# Patient Record
Sex: FEMALE | Race: WHITE | NOT HISPANIC OR LATINO | Employment: FULL TIME | URBAN - METROPOLITAN AREA
[De-identification: names, ages, dates, MRNs, and addresses within clinical notes are randomized per-mention and may not be internally consistent; named-entity substitution may affect disease eponyms.]

---
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
## 2021-03-01 ENCOUNTER — HOSPITAL ENCOUNTER (EMERGENCY)
Facility: HOSPITAL | Age: 21
Discharge: HOME | End: 2021-03-01
Attending: EMERGENCY MEDICINE
Payer: MEDICAID

## 2021-03-01 VITALS
HEIGHT: 61 IN | HEART RATE: 81 BPM | SYSTOLIC BLOOD PRESSURE: 109 MMHG | DIASTOLIC BLOOD PRESSURE: 65 MMHG | TEMPERATURE: 99 F | WEIGHT: 130 LBS | BODY MASS INDEX: 24.55 KG/M2 | OXYGEN SATURATION: 99 % | RESPIRATION RATE: 16 BRPM

## 2021-03-01 DIAGNOSIS — Z33.1 PREGNANT STATE, INCIDENTAL: ICD-10-CM

## 2021-03-01 DIAGNOSIS — S20.412A ABRASION OF LEFT SIDE OF BACK, INITIAL ENCOUNTER: ICD-10-CM
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
## 2021-03-01 PROCEDURE — 99283 EMERGENCY DEPT VISIT LOW MDM: CPT

## 2021-03-01 PROCEDURE — 25000003 PHARM REV CODE 250: Performed by: EMERGENCY MEDICINE

## 2021-03-01 RX ORDER — ACETAMINOPHEN 325 MG/1
650 TABLET ORAL
Status: COMPLETED | OUTPATIENT
Start: 2021-03-01 | End: 2021-03-01

## 2021-03-01 RX ADMIN — ACETAMINOPHEN 650 MG: 325 TABLET ORAL at 07:03

## 2022-01-08 ENCOUNTER — HOSPITAL ENCOUNTER (EMERGENCY)
Facility: HOSPITAL | Age: 22
Discharge: HOME | End: 2022-01-08
Attending: EMERGENCY MEDICINE
Payer: MEDICAID

## 2022-01-08 VITALS
OXYGEN SATURATION: 97 % | DIASTOLIC BLOOD PRESSURE: 71 MMHG | RESPIRATION RATE: 16 BRPM | WEIGHT: 123 LBS | SYSTOLIC BLOOD PRESSURE: 108 MMHG | HEART RATE: 86 BPM | TEMPERATURE: 98 F | BODY MASS INDEX: 23.22 KG/M2 | HEIGHT: 61 IN

## 2022-01-08 DIAGNOSIS — J01.90 ACUTE NON-RECURRENT SINUSITIS, UNSPECIFIED LOCATION: Primary | ICD-10-CM

## 2022-01-08 PROCEDURE — 99282 EMERGENCY DEPT VISIT SF MDM: CPT

## 2022-01-08 NOTE — ED PROVIDER NOTES
"Encounter Date: 1/8/2022       History     Chief Complaint   Patient presents with    Cough     "DURING THE DAY I'M FINE, BUT AT NIGHT I WAKE UP WITH SORE THROAT AND COUGH"     21-year-old female with complaints of cough and sore throat x1 week.  Denies fever, shortness of breath, also loss of taste loss of smell        Review of patient's allergies indicates:   Allergen Reactions    Dimetapp [brompheniramine-pseudoephedrin]     Stevia [stevioside (bulk)] Hives     Past Medical History:   Diagnosis Date    Anemia      History reviewed. No pertinent surgical history.  History reviewed. No pertinent family history.  Social History     Tobacco Use    Smoking status: Never Smoker    Smokeless tobacco: Never Used   Substance Use Topics    Alcohol use: Not Currently    Drug use: Never     Review of Systems   Constitutional: Negative for fever.   HENT: Negative for sore throat.    Respiratory: Positive for cough and chest tightness. Negative for shortness of breath.    Cardiovascular: Negative for chest pain.   Gastrointestinal: Negative for nausea.   Genitourinary: Negative for dysuria.   Musculoskeletal: Negative for back pain.   Skin: Negative for rash.   Neurological: Positive for headaches. Negative for weakness.   Hematological: Does not bruise/bleed easily.       Physical Exam     Initial Vitals [01/08/22 1647]   BP Pulse Resp Temp SpO2   108/71 86 16 98.2 °F (36.8 °C) 97 %      MAP       --         Physical Exam    Nursing note and vitals reviewed.  Constitutional: Vital signs are normal. She appears well-developed and well-nourished. She is cooperative.   HENT:   Head: Normocephalic and atraumatic.   Right Ear: Hearing and external ear normal.   Left Ear: Hearing and external ear normal.   Nose: Nose normal.   Mouth/Throat: Uvula is midline and mucous membranes are normal. Posterior oropharyngeal erythema present.   Post nasal drip    Eyes: Conjunctivae, EOM and lids are normal. Pupils are equal, round, " and reactive to light.   Neck: Trachea normal. Neck supple.   Normal range of motion.   Full passive range of motion without pain.     Cardiovascular: Normal rate, regular rhythm, S1 normal, S2 normal, normal heart sounds and normal pulses.   Pulmonary/Chest: Effort normal and breath sounds normal.   Musculoskeletal:      Cervical back: Full passive range of motion without pain, normal range of motion and neck supple.     Neurological: She is alert.         ED Course   Procedures  Labs Reviewed - No data to display       Imaging Results    None          Medications - No data to display  Medical Decision Making:   Differential Diagnosis:   Acute sinusitis, URI, pharyangitis  ED Management:  Attenuation phy patient will be due seasonal no antibiotics are needed at this time                       Clinical Impression:   Final diagnoses:  [J01.90] Acute non-recurrent sinusitis, unspecified location (Primary)          ED Disposition Condition    Discharge Stable        ED Prescriptions     None        Follow-up Information     Follow up With Specialties Details Why Contact Info    Bao Abad MD Family Medicine In 1 week If symptoms worsen [address] [facility] [address]  [phone]             Miguel Roberts III, NP  01/08/22 7029

## 2022-02-10 ENCOUNTER — HOSPITAL ENCOUNTER (OUTPATIENT)
Dept: RADIOLOGY | Facility: HOSPITAL | Age: 22
Discharge: HOME | End: 2022-02-10
Payer: MEDICAID

## 2022-02-10 DIAGNOSIS — M54.9 DORSALGIA: Primary | ICD-10-CM

## 2022-02-10 DIAGNOSIS — M54.9 DORSALGIA: ICD-10-CM

## 2022-02-10 PROCEDURE — 72110 X-RAY EXAM L-2 SPINE 4/>VWS: CPT | Mod: TC

## 2022-06-19 ENCOUNTER — HOSPITAL ENCOUNTER (EMERGENCY)
Facility: HOSPITAL | Age: 22
Discharge: HOME | End: 2022-06-19
Attending: EMERGENCY MEDICINE
Payer: MEDICAID

## 2022-06-19 VITALS
TEMPERATURE: 99 F | DIASTOLIC BLOOD PRESSURE: 88 MMHG | SYSTOLIC BLOOD PRESSURE: 127 MMHG | BODY MASS INDEX: 23.22 KG/M2 | HEIGHT: 61 IN | HEART RATE: 86 BPM | RESPIRATION RATE: 18 BRPM | WEIGHT: 123 LBS | OXYGEN SATURATION: 99 %

## 2022-06-19 DIAGNOSIS — R30.0 DYSURIA: Primary | ICD-10-CM

## 2022-06-19 LAB
APPEARANCE UR: CLEAR
B-HCG SERPL QL: NEGATIVE
BACTERIA #/AREA URNS AUTO: ABNORMAL /HPF
BILIRUB UR QL STRIP.AUTO: NEGATIVE MG/DL
COLOR UR AUTO: YELLOW
GLUCOSE UR QL STRIP.AUTO: NEGATIVE MG/DL
KETONES UR QL STRIP.AUTO: 15 MG/DL
LEUKOCYTE ESTERASE UR QL STRIP.AUTO: NEGATIVE UNIT/L
NITRITE UR QL STRIP.AUTO: NEGATIVE
PH UR STRIP.AUTO: 6 [PH]
PROT UR QL STRIP.AUTO: NEGATIVE MG/DL
RBC #/AREA URNS AUTO: ABNORMAL /HPF
RBC UR QL AUTO: ABNORMAL UNIT/L
SP GR UR STRIP.AUTO: 1.02
SQUAMOUS #/AREA URNS AUTO: ABNORMAL /HPF
UROBILINOGEN UR STRIP-ACNC: 0.2 MG/DL
WBC #/AREA URNS AUTO: ABNORMAL /HPF

## 2022-06-19 PROCEDURE — 81025 URINE PREGNANCY TEST: CPT | Performed by: EMERGENCY MEDICINE

## 2022-06-19 PROCEDURE — 81001 URINALYSIS AUTO W/SCOPE: CPT | Performed by: EMERGENCY MEDICINE

## 2022-06-19 PROCEDURE — 99282 EMERGENCY DEPT VISIT SF MDM: CPT | Mod: 25

## 2022-06-19 NOTE — ED PROVIDER NOTES
Encounter Date: 6/19/2022       History     Chief Complaint   Patient presents with    Urinary Frequency     Reports has sexual intercourse with  last night and now having frequent urination and burning.  here with same complaint     23 y/o female with c/o 1 day h/o mild dysuria with no associated urgency, frequency, fever, vaginal dc or flank pain. Pt states the discomfort started after sex with her  last night. Pt with no associated HA, neck pain, SSCP, SOB, AP, nausea, vomiting or diarrhea. Requesting UA and pregnancy test. Pt has not taken any medication for the pain.         Review of patient's allergies indicates:   Allergen Reactions    Dimetapp cold and flu      Other reaction(s): hives    Dimetapp [brompheniramine-pseudoephedrin]     Stevia [stevioside (bulk)] Hives     Past Medical History:   Diagnosis Date    Anemia     Chronic constipation      History reviewed. No pertinent surgical history.  History reviewed. No pertinent family history.  Social History     Tobacco Use    Smoking status: Never Smoker    Smokeless tobacco: Never Used   Substance Use Topics    Alcohol use: Yes     Comment: occasion    Drug use: Never     Review of Systems   All other systems reviewed and are negative.      Physical Exam     Initial Vitals [06/19/22 0924]   BP Pulse Resp Temp SpO2   127/88 86 18 99 °F (37.2 °C) 99 %      MAP       --         Physical Exam    Constitutional: She appears well-developed and well-nourished.   HENT:   Head: Normocephalic and atraumatic.   Eyes: EOM are normal. Pupils are equal, round, and reactive to light.   Neck: Neck supple.   Normal range of motion.  Cardiovascular: Normal rate, regular rhythm, normal heart sounds and intact distal pulses.   Pulmonary/Chest: Breath sounds normal.   Abdominal: Abdomen is soft. Bowel sounds are normal.   Musculoskeletal:         General: Normal range of motion.      Cervical back: Normal range of motion and neck supple.      Neurological: She is alert and oriented to person, place, and time. She has normal strength and normal reflexes.   Skin: Skin is warm and dry. Capillary refill takes less than 2 seconds.   Psychiatric: She has a normal mood and affect. Her behavior is normal. Judgment and thought content normal.         ED Course   Procedures  Labs Reviewed   URINALYSIS, REFLEX TO URINE CULTURE - Abnormal; Notable for the following components:       Result Value    Ketones, UA 15  (*)     Blood, UA Trace-Intact (*)     All other components within normal limits   URINALYSIS, MICROSCOPIC - Abnormal; Notable for the following components:    Squamous Epithelial Cells, UA Few (*)     All other components within normal limits   HCG QUALITATIVE URINE - Normal          Imaging Results    None          Medications - No data to display  Medical Decision Making:   ED Management:  21 y/o c/o mild dysuria after sexual intercourse last night. UA negative for infection and pregnancy. No pain on exam. Will dc home to fu PMD as OP, advised return PRN worsening symptoms.                      Clinical Impression:   Final diagnoses:  [R30.0] Dysuria (Primary)          ED Disposition Condition    Discharge Stable        ED Prescriptions     None        Follow-up Information     Follow up With Specialties Details Why Contact Info    Bao Abad MD Family Medicine Schedule an appointment as soon as possible for a visit in 1 day  [address] [facility] [address]  [phone]             Albin Reeves MD  06/19/22 6734       Albin Reeves MD  06/19/22 6069

## 2023-05-27 ENCOUNTER — HOSPITAL ENCOUNTER (INPATIENT)
Facility: HOSPITAL | Age: 23
LOS: 3 days | Discharge: HOME | DRG: 917 | End: 2023-05-30
Attending: STUDENT IN AN ORGANIZED HEALTH CARE EDUCATION/TRAINING PROGRAM | Admitting: INTERNAL MEDICINE
Payer: MEDICAID

## 2023-05-27 DIAGNOSIS — I21.4 NSTEMI (NON-ST ELEVATED MYOCARDIAL INFARCTION): ICD-10-CM

## 2023-05-27 DIAGNOSIS — Z00.8 MEDICAL CLEARANCE FOR PSYCHIATRIC ADMISSION: ICD-10-CM

## 2023-05-27 DIAGNOSIS — F41.0 PANIC ATTACK: ICD-10-CM

## 2023-05-27 DIAGNOSIS — F14.10 COCAINE ABUSE: ICD-10-CM

## 2023-05-27 DIAGNOSIS — R79.89 ELEVATED TROPONIN: Primary | ICD-10-CM

## 2023-05-27 LAB
ALBUMIN SERPL BCP-MCNC: 4.1 G/DL (ref 3.5–5.2)
ALP SERPL-CCNC: 53 U/L (ref 55–135)
ALT SERPL W/O P-5'-P-CCNC: 17 U/L (ref 10–44)
AMPHET+METHAMPHET UR QL: ABNORMAL
ANION GAP SERPL CALC-SCNC: 12 MMOL/L (ref 8–16)
APAP SERPL-MCNC: <2 UG/ML (ref 10–20)
APTT PPP: 24.6 SEC (ref 21–32)
AST SERPL-CCNC: 13 U/L (ref 10–40)
B-HCG UR QL: NEGATIVE
BARBITURATES UR QL SCN>200 NG/ML: NEGATIVE
BASOPHILS # BLD AUTO: 0.02 K/UL (ref 0–0.2)
BASOPHILS NFR BLD: 0.2 % (ref 0–1.9)
BENZODIAZ UR QL SCN>200 NG/ML: ABNORMAL
BILIRUB SERPL-MCNC: 0.9 MG/DL (ref 0.1–1)
BUN SERPL-MCNC: 8 MG/DL (ref 6–20)
BZE UR QL SCN: NEGATIVE
CALCIUM SERPL-MCNC: 10.3 MG/DL (ref 8.7–10.5)
CANNABINOIDS UR QL SCN: NEGATIVE
CHLORIDE SERPL-SCNC: 107 MMOL/L (ref 95–110)
CO2 SERPL-SCNC: 19 MMOL/L (ref 23–29)
CREAT SERPL-MCNC: 1 MG/DL (ref 0.5–1.4)
CREAT UR-MCNC: 59 MG/DL (ref 15–325)
DIFFERENTIAL METHOD: ABNORMAL
EOSINOPHIL # BLD AUTO: 0 K/UL (ref 0–0.5)
EOSINOPHIL NFR BLD: 0 % (ref 0–8)
ERYTHROCYTE [DISTWIDTH] IN BLOOD BY AUTOMATED COUNT: 11 % (ref 11.5–14.5)
EST. GFR  (NO RACE VARIABLE): >60 ML/MIN/1.73 M^2
ETHANOL SERPL-MCNC: <3 MG/DL
GLUCOSE SERPL-MCNC: 110 MG/DL (ref 70–110)
HCT VFR BLD AUTO: 43.2 % (ref 37–48.5)
HGB BLD-MCNC: 15.4 G/DL (ref 12–16)
IMM GRANULOCYTES # BLD AUTO: 0.03 K/UL (ref 0–0.04)
IMM GRANULOCYTES NFR BLD AUTO: 0.3 % (ref 0–0.5)
INR PPP: 1 (ref 0.8–1.2)
LYMPHOCYTES # BLD AUTO: 2.5 K/UL (ref 1–4.8)
LYMPHOCYTES NFR BLD: 21.8 % (ref 18–48)
MAGNESIUM SERPL-MCNC: 1.6 MG/DL (ref 1.6–2.6)
MCH RBC QN AUTO: 30.9 PG (ref 27–31)
MCHC RBC AUTO-ENTMCNC: 35.6 G/DL (ref 32–36)
MCV RBC AUTO: 87 FL (ref 82–98)
METHADONE UR QL SCN>300 NG/ML: NEGATIVE
MONOCYTES # BLD AUTO: 0.9 K/UL (ref 0.3–1)
MONOCYTES NFR BLD: 7.8 % (ref 4–15)
NEUTROPHILS # BLD AUTO: 8 K/UL (ref 1.8–7.7)
NEUTROPHILS NFR BLD: 69.9 % (ref 38–73)
NRBC BLD-RTO: 0 /100 WBC
NT-PROBNP SERPL-MCNC: 348 PG/ML (ref 5–450)
OPIATES UR QL SCN: NEGATIVE
PCP UR QL SCN>25 NG/ML: NEGATIVE
PLATELET # BLD AUTO: 333 K/UL (ref 150–450)
PMV BLD AUTO: 10.2 FL (ref 9.2–12.9)
POTASSIUM SERPL-SCNC: 3.1 MMOL/L (ref 3.5–5.1)
PROT SERPL-MCNC: 8.7 G/DL (ref 6–8.4)
PROTHROMBIN TIME: 10.6 SEC (ref 9–12.5)
RBC # BLD AUTO: 4.99 M/UL (ref 4–5.4)
SODIUM SERPL-SCNC: 138 MMOL/L (ref 136–145)
TOXICOLOGY INFORMATION: ABNORMAL
TROPONIN I SERPL DL<=0.01 NG/ML-MCNC: 163 PG/ML (ref 0–60)
TROPONIN I SERPL DL<=0.01 NG/ML-MCNC: 163.2 PG/ML (ref 0–60)
TSH SERPL DL<=0.005 MIU/L-ACNC: 3.46 UIU/ML (ref 0.4–4)
WBC # BLD AUTO: 11.38 K/UL (ref 3.9–12.7)

## 2023-05-27 PROCEDURE — 63600175 PHARM REV CODE 636 W HCPCS: Performed by: STUDENT IN AN ORGANIZED HEALTH CARE EDUCATION/TRAINING PROGRAM

## 2023-05-27 PROCEDURE — 36415 COLL VENOUS BLD VENIPUNCTURE: CPT | Performed by: STUDENT IN AN ORGANIZED HEALTH CARE EDUCATION/TRAINING PROGRAM

## 2023-05-27 PROCEDURE — 83735 ASSAY OF MAGNESIUM: CPT | Performed by: STUDENT IN AN ORGANIZED HEALTH CARE EDUCATION/TRAINING PROGRAM

## 2023-05-27 PROCEDURE — 85730 THROMBOPLASTIN TIME PARTIAL: CPT | Performed by: STUDENT IN AN ORGANIZED HEALTH CARE EDUCATION/TRAINING PROGRAM

## 2023-05-27 PROCEDURE — 96374 THER/PROPH/DIAG INJ IV PUSH: CPT

## 2023-05-27 PROCEDURE — 96361 HYDRATE IV INFUSION ADD-ON: CPT | Mod: 59

## 2023-05-27 PROCEDURE — G0378 HOSPITAL OBSERVATION PER HR: HCPCS

## 2023-05-27 PROCEDURE — 93010 ELECTROCARDIOGRAM REPORT: CPT | Mod: 59,,, | Performed by: INTERNAL MEDICINE

## 2023-05-27 PROCEDURE — 11000001 HC ACUTE MED/SURG PRIVATE ROOM

## 2023-05-27 PROCEDURE — 84484 ASSAY OF TROPONIN QUANT: CPT | Performed by: STUDENT IN AN ORGANIZED HEALTH CARE EDUCATION/TRAINING PROGRAM

## 2023-05-27 PROCEDURE — 80053 COMPREHEN METABOLIC PANEL: CPT | Performed by: STUDENT IN AN ORGANIZED HEALTH CARE EDUCATION/TRAINING PROGRAM

## 2023-05-27 PROCEDURE — 85610 PROTHROMBIN TIME: CPT | Performed by: STUDENT IN AN ORGANIZED HEALTH CARE EDUCATION/TRAINING PROGRAM

## 2023-05-27 PROCEDURE — 85025 COMPLETE CBC W/AUTO DIFF WBC: CPT | Performed by: STUDENT IN AN ORGANIZED HEALTH CARE EDUCATION/TRAINING PROGRAM

## 2023-05-27 PROCEDURE — 82077 ASSAY SPEC XCP UR&BREATH IA: CPT | Performed by: STUDENT IN AN ORGANIZED HEALTH CARE EDUCATION/TRAINING PROGRAM

## 2023-05-27 PROCEDURE — 80307 DRUG TEST PRSMV CHEM ANLYZR: CPT | Performed by: STUDENT IN AN ORGANIZED HEALTH CARE EDUCATION/TRAINING PROGRAM

## 2023-05-27 PROCEDURE — 80143 DRUG ASSAY ACETAMINOPHEN: CPT | Performed by: STUDENT IN AN ORGANIZED HEALTH CARE EDUCATION/TRAINING PROGRAM

## 2023-05-27 PROCEDURE — 96376 TX/PRO/DX INJ SAME DRUG ADON: CPT

## 2023-05-27 PROCEDURE — 99285 EMERGENCY DEPT VISIT HI MDM: CPT | Mod: 25

## 2023-05-27 PROCEDURE — 83880 ASSAY OF NATRIURETIC PEPTIDE: CPT | Performed by: STUDENT IN AN ORGANIZED HEALTH CARE EDUCATION/TRAINING PROGRAM

## 2023-05-27 PROCEDURE — 25000003 PHARM REV CODE 250: Performed by: STUDENT IN AN ORGANIZED HEALTH CARE EDUCATION/TRAINING PROGRAM

## 2023-05-27 PROCEDURE — 93010 EKG 12-LEAD: ICD-10-PCS | Mod: 59,,, | Performed by: INTERNAL MEDICINE

## 2023-05-27 PROCEDURE — 84443 ASSAY THYROID STIM HORMONE: CPT | Performed by: STUDENT IN AN ORGANIZED HEALTH CARE EDUCATION/TRAINING PROGRAM

## 2023-05-27 PROCEDURE — 81025 URINE PREGNANCY TEST: CPT | Performed by: STUDENT IN AN ORGANIZED HEALTH CARE EDUCATION/TRAINING PROGRAM

## 2023-05-27 PROCEDURE — 93005 ELECTROCARDIOGRAM TRACING: CPT

## 2023-05-27 RX ORDER — NORELGESTROMIN AND ETHINYL ESTRADIOL 35; 150 UG/MG; UG/MG
1 PATCH TRANSDERMAL WEEKLY
COMMUNITY

## 2023-05-27 RX ORDER — ONDANSETRON 2 MG/ML
4 INJECTION INTRAMUSCULAR; INTRAVENOUS EVERY 8 HOURS PRN
Status: DISCONTINUED | OUTPATIENT
Start: 2023-05-27 | End: 2023-05-30 | Stop reason: HOSPADM

## 2023-05-27 RX ORDER — POTASSIUM CHLORIDE 20 MEQ/1
40 TABLET, EXTENDED RELEASE ORAL
Status: COMPLETED | OUTPATIENT
Start: 2023-05-27 | End: 2023-05-27

## 2023-05-27 RX ORDER — LORAZEPAM 2 MG/ML
1 INJECTION INTRAMUSCULAR
Status: COMPLETED | OUTPATIENT
Start: 2023-05-27 | End: 2023-05-27

## 2023-05-27 RX ORDER — TALC
6 POWDER (GRAM) TOPICAL NIGHTLY PRN
Status: DISCONTINUED | OUTPATIENT
Start: 2023-05-27 | End: 2023-05-30 | Stop reason: HOSPADM

## 2023-05-27 RX ORDER — IBUPROFEN 600 MG/1
600 TABLET ORAL EVERY 6 HOURS PRN
Status: DISCONTINUED | OUTPATIENT
Start: 2023-05-27 | End: 2023-05-28

## 2023-05-27 RX ORDER — SODIUM CHLORIDE 0.9 % (FLUSH) 0.9 %
10 SYRINGE (ML) INJECTION
Status: DISCONTINUED | OUTPATIENT
Start: 2023-05-27 | End: 2023-05-30 | Stop reason: HOSPADM

## 2023-05-27 RX ORDER — ASPIRIN 325 MG
325 TABLET ORAL
Status: COMPLETED | OUTPATIENT
Start: 2023-05-27 | End: 2023-05-27

## 2023-05-27 RX ADMIN — SODIUM CHLORIDE 1000 ML: 9 INJECTION, SOLUTION INTRAVENOUS at 06:05

## 2023-05-27 RX ADMIN — SODIUM CHLORIDE, POTASSIUM CHLORIDE, SODIUM LACTATE AND CALCIUM CHLORIDE 1000 ML: 600; 310; 30; 20 INJECTION, SOLUTION INTRAVENOUS at 08:05

## 2023-05-27 RX ADMIN — LORAZEPAM 1 MG: 2 INJECTION INTRAMUSCULAR; INTRAVENOUS at 06:05

## 2023-05-27 RX ADMIN — LORAZEPAM 1 MG: 2 INJECTION INTRAMUSCULAR; INTRAVENOUS at 07:05

## 2023-05-27 RX ADMIN — ASPIRIN 325 MG ORAL TABLET 325 MG: 325 PILL ORAL at 07:05

## 2023-05-27 RX ADMIN — POTASSIUM CHLORIDE 40 MEQ: 1500 TABLET, EXTENDED RELEASE ORAL at 07:05

## 2023-05-27 NOTE — ED NOTES
Pt reports to ED complaining of doing cocaine early this morning and still not feeling right. States she feels nauseous, numb and anxious. States she has done cocaine before but has never felt like this. Reports that she is not sure of the actual substance she ingested. Pt current HR at 123

## 2023-05-27 NOTE — ED PROVIDER NOTES
"Encounter Date: 5/27/2023       History     Chief Complaint   Patient presents with    Drug / Alcohol Assessment     Reports snorted cocaine at 1 or 2 this AM, "something isn't right" reports anxious, numb all over, nausea. Reports called 911 earlier this afternoon and was checked out by EMS     22 y/o female presents with diffuse body numbness, sense of doom, and nausea that started earlier today. Patient mentioned that she was binged drinking last night and used some cocaine so have not went to sleep. Denies chest pain, sob, SI, auditory hallucination, abdominal pain, vomiting     Review of patient's allergies indicates:   Allergen Reactions    Dimetapp cold and flu      Other reaction(s): hives    Dimetapp [brompheniramine-pseudoephedrin]     Stevia [stevioside (bulk)] Hives     Past Medical History:   Diagnosis Date    Anemia     Chronic constipation      History reviewed. No pertinent surgical history.  History reviewed. No pertinent family history.  Social History     Tobacco Use    Smoking status: Never    Smokeless tobacco: Never   Substance Use Topics    Alcohol use: Yes     Comment: occasion    Drug use: Yes     Types: Cocaine     Review of Systems   Constitutional: Negative.    HENT: Negative.     Respiratory: Negative.     Cardiovascular: Negative.    Gastrointestinal: Negative.    Genitourinary: Negative.    Musculoskeletal: Negative.    Skin: Negative.    Neurological:  Positive for numbness.   Psychiatric/Behavioral:  The patient is nervous/anxious.    All other systems reviewed and are negative.    Physical Exam     Initial Vitals [05/27/23 1818]   BP Pulse Resp Temp SpO2   130/84 (!) 159 18 97.9 °F (36.6 °C) 100 %      MAP       --         Physical Exam    Nursing note and vitals reviewed.  Constitutional: Vital signs are normal.   HENT:   Head: Normocephalic and atraumatic.   Eyes: Conjunctivae and lids are normal.   Neck: Trachea normal. Neck supple.   Cardiovascular:  Regular rhythm, normal heart " sounds, intact distal pulses and normal pulses.           No murmur heard.  Pulmonary/Chest: Breath sounds normal. No respiratory distress.   Abdominal: Abdomen is soft. Bowel sounds are normal.   Musculoskeletal:         General: Normal range of motion.      Cervical back: Neck supple.     Neurological: She is alert and oriented to person, place, and time. She has normal strength. No cranial nerve deficit or sensory deficit.   Skin: Skin is warm. Capillary refill takes less than 2 seconds.   Psychiatric: Her speech is normal. Judgment and thought content normal. Her mood appears anxious. Her affect is labile. Cognition and memory are normal.       ED Course   Critical Care    Date/Time: 5/27/2023 6:40 PM  Performed by: Jsoh Rios MD  Authorized by: Josh Rios MD   Direct patient critical care time: 10 minutes  Additional history critical care time: 5 minutes  Ordering / reviewing critical care time: 5 minutes  Documentation critical care time: 5 minutes  Consulting other physicians critical care time: 5 minutes  Other critical care time: 15 minutes  Total critical care time (exclusive of procedural time) : 45 minutes      Labs Reviewed   CBC W/ AUTO DIFFERENTIAL - Abnormal; Notable for the following components:       Result Value    RDW 11.0 (*)     Gran # (ANC) 8.0 (*)     All other components within normal limits   COMPREHENSIVE METABOLIC PANEL - Abnormal; Notable for the following components:    Potassium 3.1 (*)     CO2 19 (*)     Total Protein 8.7 (*)     Alkaline Phosphatase 53 (*)     All other components within normal limits   DRUG SCREEN PANEL, URINE EMERGENCY - Abnormal; Notable for the following components:    Benzodiazepines Presumptive Positive (*)     Amphetamine Screen, Ur Presumptive Positive (*)     All other components within normal limits    Narrative:     Specimen Source->Urine   ACETAMINOPHEN LEVEL - Abnormal; Notable for the following components:    Acetaminophen (Tylenol), Serum <2.0  (*)     All other components within normal limits   TROPONIN I HIGH SENSITIVITY - Abnormal; Notable for the following components:    Troponin I High Sensitivity 163.2 (*)     All other components within normal limits   TROPONIN I HIGH SENSITIVITY - Abnormal; Notable for the following components:    Troponin I High Sensitivity 163.0 (*)     All other components within normal limits   ALCOHOL,MEDICAL (ETHANOL)   TSH   MAGNESIUM   NT-PRO NATRIURETIC PEPTIDE   PREGNANCY TEST, URINE RAPID    Narrative:     Specimen Source->Urine   PROTIME-INR   APTT          Imaging Results    None          Medications   LORazepam injection 1 mg (1 mg Intravenous Given 5/27/23 1840)   sodium chloride 0.9% bolus 1,000 mL 1,000 mL (0 mLs Intravenous Stopped 5/27/23 1952)   aspirin tablet 325 mg (325 mg Oral Given 5/27/23 1920)   potassium chloride SA CR tablet 40 mEq (40 mEq Oral Given 5/27/23 1920)   LORazepam injection 1 mg (1 mg Intravenous Given 5/27/23 1920)   lactated ringers bolus 1,000 mL (0 mLs Intravenous Stopped 5/27/23 2130)     Medical Decision Making:   Initial Assessment:   22 y/o with no pmh presents with multiple complains after staying up late drinking alcohol and snorting cocaine. Tachycardia but otherwise stable vitals. Patient appears anxious. Denies cp. Will check for co ingestion. Ativan. Reeval   Clinical Tests:   Lab Tests: Reviewed and Ordered  The following lab test(s) were unremarkable: CBC, CMP, Troponin and BNP  Medical Tests: Ordered and Reviewed           ED Course as of 05/27/23 2159   Sat May 27, 2023   1906 Troponin I High Sensitivity(!): 163.2  Elevated troponin.  Most likely demand ischemia.  EKG shows diffuse ST depression concerning for the endocardial injury.  Ativan already given.  Will give a dose aspirin. [HD]   2156 Spoke to cardiologist who recommends admitting patient for observation.  Will continue cardiac monitoring.  Diltiazem. [HD]      ED Course User Index  [HD] Josh Rios MD                    Clinical Impression:   Final diagnoses:  [Z00.8] Medical clearance for psychiatric admission  [F41.0] Panic attack (Primary)  [F14.10] Cocaine abuse  [R77.8] Elevated troponin  [I21.4] NSTEMI (non-ST elevated myocardial infarction)        ED Disposition Condition    Observation Stable                Josh Rios MD  05/27/23 5435

## 2023-05-28 PROBLEM — I21.4 NON-STEMI (NON-ST ELEVATED MYOCARDIAL INFARCTION): Status: ACTIVE | Noted: 2023-05-28

## 2023-05-28 LAB
ALBUMIN SERPL BCP-MCNC: 3 G/DL (ref 3.5–5.2)
ALP SERPL-CCNC: 40 U/L (ref 55–135)
ALT SERPL W/O P-5'-P-CCNC: 19 U/L (ref 10–44)
ANION GAP SERPL CALC-SCNC: 3 MMOL/L (ref 8–16)
AST SERPL-CCNC: 16 U/L (ref 10–40)
BASOPHILS # BLD AUTO: 0.03 K/UL (ref 0–0.2)
BASOPHILS NFR BLD: 0.4 % (ref 0–1.9)
BILIRUB SERPL-MCNC: 0.8 MG/DL (ref 0.1–1)
BUN SERPL-MCNC: 8 MG/DL (ref 6–20)
CALCIUM SERPL-MCNC: 8.6 MG/DL (ref 8.7–10.5)
CHLORIDE SERPL-SCNC: 114 MMOL/L (ref 95–110)
CO2 SERPL-SCNC: 24 MMOL/L (ref 23–29)
CREAT SERPL-MCNC: 0.7 MG/DL (ref 0.5–1.4)
D DIMER PPP IA.FEU-MCNC: 0.51 MG/L FEU
DIFFERENTIAL METHOD: ABNORMAL
EOSINOPHIL # BLD AUTO: 0.2 K/UL (ref 0–0.5)
EOSINOPHIL NFR BLD: 1.9 % (ref 0–8)
ERYTHROCYTE [DISTWIDTH] IN BLOOD BY AUTOMATED COUNT: 11.3 % (ref 11.5–14.5)
EST. GFR  (NO RACE VARIABLE): >60 ML/MIN/1.73 M^2
GLUCOSE SERPL-MCNC: 104 MG/DL (ref 70–110)
HCT VFR BLD AUTO: 37.2 % (ref 37–48.5)
HGB BLD-MCNC: 12.5 G/DL (ref 12–16)
IMM GRANULOCYTES # BLD AUTO: 0.01 K/UL (ref 0–0.04)
IMM GRANULOCYTES NFR BLD AUTO: 0.1 % (ref 0–0.5)
LYMPHOCYTES # BLD AUTO: 2.4 K/UL (ref 1–4.8)
LYMPHOCYTES NFR BLD: 30.6 % (ref 18–48)
MCH RBC QN AUTO: 30.3 PG (ref 27–31)
MCHC RBC AUTO-ENTMCNC: 33.6 G/DL (ref 32–36)
MCV RBC AUTO: 90 FL (ref 82–98)
MONOCYTES # BLD AUTO: 0.9 K/UL (ref 0.3–1)
MONOCYTES NFR BLD: 10.9 % (ref 4–15)
NEUTROPHILS # BLD AUTO: 4.5 K/UL (ref 1.8–7.7)
NEUTROPHILS NFR BLD: 56.1 % (ref 38–73)
NRBC BLD-RTO: 0 /100 WBC
PLATELET # BLD AUTO: 230 K/UL (ref 150–450)
PMV BLD AUTO: 9.7 FL (ref 9.2–12.9)
POTASSIUM SERPL-SCNC: 3.9 MMOL/L (ref 3.5–5.1)
PROT SERPL-MCNC: 6.5 G/DL (ref 6–8.4)
RBC # BLD AUTO: 4.12 M/UL (ref 4–5.4)
SODIUM SERPL-SCNC: 141 MMOL/L (ref 136–145)
TROPONIN I SERPL DL<=0.01 NG/ML-MCNC: 135.4 PG/ML (ref 0–60)
TROPONIN I SERPL DL<=0.01 NG/ML-MCNC: 90.1 PG/ML (ref 0–60)
WBC # BLD AUTO: 7.92 K/UL (ref 3.9–12.7)

## 2023-05-28 PROCEDURE — 99900035 HC TECH TIME PER 15 MIN (STAT)

## 2023-05-28 PROCEDURE — 93005 ELECTROCARDIOGRAM TRACING: CPT

## 2023-05-28 PROCEDURE — 99900031 HC PATIENT EDUCATION (STAT)

## 2023-05-28 PROCEDURE — 36415 COLL VENOUS BLD VENIPUNCTURE: CPT | Performed by: INTERNAL MEDICINE

## 2023-05-28 PROCEDURE — 25000003 PHARM REV CODE 250: Performed by: INTERNAL MEDICINE

## 2023-05-28 PROCEDURE — 84484 ASSAY OF TROPONIN QUANT: CPT | Performed by: STUDENT IN AN ORGANIZED HEALTH CARE EDUCATION/TRAINING PROGRAM

## 2023-05-28 PROCEDURE — 80053 COMPREHEN METABOLIC PANEL: CPT | Performed by: STUDENT IN AN ORGANIZED HEALTH CARE EDUCATION/TRAINING PROGRAM

## 2023-05-28 PROCEDURE — 85379 FIBRIN DEGRADATION QUANT: CPT | Performed by: INTERNAL MEDICINE

## 2023-05-28 PROCEDURE — 94761 N-INVAS EAR/PLS OXIMETRY MLT: CPT

## 2023-05-28 PROCEDURE — 85025 COMPLETE CBC W/AUTO DIFF WBC: CPT | Performed by: STUDENT IN AN ORGANIZED HEALTH CARE EDUCATION/TRAINING PROGRAM

## 2023-05-28 PROCEDURE — 11000001 HC ACUTE MED/SURG PRIVATE ROOM

## 2023-05-28 PROCEDURE — 36415 COLL VENOUS BLD VENIPUNCTURE: CPT | Performed by: STUDENT IN AN ORGANIZED HEALTH CARE EDUCATION/TRAINING PROGRAM

## 2023-05-28 PROCEDURE — 25000003 PHARM REV CODE 250: Performed by: STUDENT IN AN ORGANIZED HEALTH CARE EDUCATION/TRAINING PROGRAM

## 2023-05-28 PROCEDURE — G0378 HOSPITAL OBSERVATION PER HR: HCPCS

## 2023-05-28 PROCEDURE — 93010 ELECTROCARDIOGRAM REPORT: CPT | Mod: ,,, | Performed by: INTERNAL MEDICINE

## 2023-05-28 PROCEDURE — 93010 EKG 12-LEAD: ICD-10-PCS | Mod: ,,, | Performed by: INTERNAL MEDICINE

## 2023-05-28 PROCEDURE — 27000221 HC OXYGEN, UP TO 24 HOURS

## 2023-05-28 PROCEDURE — 63600175 PHARM REV CODE 636 W HCPCS: Performed by: INTERNAL MEDICINE

## 2023-05-28 PROCEDURE — 96372 THER/PROPH/DIAG INJ SC/IM: CPT | Performed by: INTERNAL MEDICINE

## 2023-05-28 RX ORDER — DILTIAZEM HYDROCHLORIDE 120 MG/1
120 CAPSULE, COATED, EXTENDED RELEASE ORAL DAILY
Status: DISCONTINUED | OUTPATIENT
Start: 2023-05-28 | End: 2023-05-29

## 2023-05-28 RX ORDER — CLOPIDOGREL BISULFATE 75 MG/1
75 TABLET ORAL ONCE
Status: COMPLETED | OUTPATIENT
Start: 2023-05-28 | End: 2023-05-28

## 2023-05-28 RX ORDER — NITROGLYCERIN 80 MG/1
1 PATCH TRANSDERMAL DAILY
Status: DISCONTINUED | OUTPATIENT
Start: 2023-05-28 | End: 2023-05-29

## 2023-05-28 RX ORDER — ALPRAZOLAM 0.25 MG/1
0.25 TABLET ORAL EVERY 8 HOURS PRN
Status: DISCONTINUED | OUTPATIENT
Start: 2023-05-28 | End: 2023-05-28

## 2023-05-28 RX ORDER — ALPRAZOLAM 0.5 MG/1
0.5 TABLET ORAL EVERY 8 HOURS PRN
Status: DISCONTINUED | OUTPATIENT
Start: 2023-05-28 | End: 2023-05-29

## 2023-05-28 RX ORDER — NAPROXEN SODIUM 220 MG/1
81 TABLET, FILM COATED ORAL DAILY
Status: DISCONTINUED | OUTPATIENT
Start: 2023-05-28 | End: 2023-05-30 | Stop reason: HOSPADM

## 2023-05-28 RX ORDER — ENOXAPARIN SODIUM 100 MG/ML
1 INJECTION SUBCUTANEOUS EVERY 12 HOURS
Status: DISCONTINUED | OUTPATIENT
Start: 2023-05-28 | End: 2023-05-29

## 2023-05-28 RX ADMIN — ASPIRIN 81 MG CHEWABLE TABLET 81 MG: 81 TABLET CHEWABLE at 10:05

## 2023-05-28 RX ADMIN — NITROGLYCERIN 1 PATCH: 0.4 PATCH TRANSDERMAL at 05:05

## 2023-05-28 RX ADMIN — Medication 6 MG: at 11:05

## 2023-05-28 RX ADMIN — DILTIAZEM HYDROCHLORIDE 120 MG: 120 CAPSULE, COATED, EXTENDED RELEASE ORAL at 05:05

## 2023-05-28 RX ADMIN — ALPRAZOLAM 0.25 MG: 0.25 TABLET ORAL at 07:05

## 2023-05-28 RX ADMIN — ALPRAZOLAM 0.5 MG: 0.5 TABLET ORAL at 09:05

## 2023-05-28 RX ADMIN — ENOXAPARIN SODIUM 60 MG: 60 INJECTION SUBCUTANEOUS at 08:05

## 2023-05-28 RX ADMIN — ENOXAPARIN SODIUM 60 MG: 60 INJECTION SUBCUTANEOUS at 09:05

## 2023-05-28 RX ADMIN — CLOPIDOGREL BISULFATE 75 MG: 75 TABLET ORAL at 06:05

## 2023-05-28 NOTE — SUBJECTIVE & OBJECTIVE
Past Medical History:   Diagnosis Date    Anemia     Chronic constipation        History reviewed. No pertinent surgical history.    Review of patient's allergies indicates:   Allergen Reactions    Dimetapp cold and flu      Other reaction(s): hives    Dimetapp [brompheniramine-pseudoephedrin]     Stevia [stevioside (bulk)] Hives       No current facility-administered medications on file prior to encounter.     Current Outpatient Medications on File Prior to Encounter   Medication Sig    ibuprofen (ADVIL,MOTRIN) 800 MG tablet Take 1 tablet (800 mg total) by mouth 3 (three) times daily.    iron, carbonyl 45 mg Tab Take by mouth.    linaCLOtide (LINZESS) 145 mcg Cap capsule Take 145 mcg by mouth before breakfast.    norelgestromin-ethinyl estradiol 150-35 mcg/24 hr Place 1 patch onto the skin once a week.    PNV no.95/ferrous fum/folic ac (PRENATAL ORAL) Take by mouth.     Family History    None       Tobacco Use    Smoking status: Never    Smokeless tobacco: Never   Substance and Sexual Activity    Alcohol use: Yes     Comment: occasion    Drug use: Yes     Types: Cocaine    Sexual activity: Not on file     Review of Systems   Cardiovascular:  Positive for chest pain. Negative for cyanosis, dyspnea on exertion, irregular heartbeat and leg swelling.   Respiratory:  Positive for shortness of breath. Negative for hemoptysis, sputum production and wheezing.    Neurological:  Negative for focal weakness, light-headedness, loss of balance, numbness, paresthesias, seizures and weakness.   Objective:     Vital Signs (Most Recent):  Temp: 98 °F (36.7 °C) (05/28/23 0436)  Pulse: (!) 126 (05/28/23 0706)  Resp: 18 (05/28/23 0436)  BP: 108/64 (05/28/23 0706)  SpO2: 98 % (05/28/23 0706) Vital Signs (24h Range):  Temp:  [97.9 °F (36.6 °C)-98.1 °F (36.7 °C)] 98 °F (36.7 °C)  Pulse:  [] 126  Resp:  [18-27] 18  SpO2:  [98 %-100 %] 98 %  BP: (104-130)/(64-90) 108/64     Weight: 55.1 kg (121 lb 8 oz)  Body mass index is 22.96  kg/m².    SpO2: 98 %         Intake/Output Summary (Last 24 hours) at 5/28/2023 1056  Last data filed at 5/28/2023 0451  Gross per 24 hour   Intake 2359 ml   Output --   Net 2359 ml       Lines/Drains/Airways       Peripheral Intravenous Line  Duration                  Peripheral IV - Single Lumen 05/27/23 1823 18 G Left Antecubital <1 day                     Physical Exam     Significant Labs: CMP   Recent Labs   Lab 05/27/23  1830 05/28/23  0520    141   K 3.1* 3.9    114*   CO2 19* 24    104   BUN 8 8   CREATININE 1.0 0.7   CALCIUM 10.3 8.6*   PROT 8.7* 6.5   ALBUMIN 4.1 3.0*   BILITOT 0.9 0.8   ALKPHOS 53* 40*   AST 13 16   ALT 17 19   ANIONGAP 12 3*   , CBC   Recent Labs   Lab 05/27/23  1830 05/28/23  0520   WBC 11.38 7.92   HGB 15.4 12.5   HCT 43.2 37.2    230   , and Troponin No results for input(s): TROPONINI in the last 48 hours.

## 2023-05-28 NOTE — PLAN OF CARE
Plan of care reviewed at bedside with patient and . Assessment completed per flow sheet. Oriented patient to call bell and room. Denies any chest pain or discomfort. Tele monitor placed, call bell is in reach. Will continue to monitor.

## 2023-05-28 NOTE — HPI
Cardiology Admission Note        Today's Date:  5/28/2023  Patient Name: Laney M Reyes    MRN: [id number]    Code Status: Full Code     Admitting Physician: DARBY Ordaz MD  ______________________________________________________________________________    Reason for Admission:  Non ST segment elevated MI  Heart palpitations/tachycardia    Temp: 98 °F (36.7 °C) (05/28/23 0436)  Pulse: (!) 126 (05/28/23 0706)  Resp: 18 (05/28/23 0436)  BP: 108/64 (05/28/23 0706)  SpO2: 98 % (05/28/23 0706)  Subjective:   The patient is a healthy 23-year-old lady who presents to [facility] with complaints of chest pain, heart palpitations, dizziness, shortness of breath, and numbness throughout her body after ingesting methamphetamine during a birthday celebration.  The patient is a nonsmoker, and does not use recreational drugs.      When she presented to the ER, she was noted to tachycardic.      Test Reason : I21.4,     Vent. Rate : 125 BPM     Atrial Rate : 125 BPM      P-R Int : 116 ms          QRS Dur : 074 ms       QT Int : 304 ms       P-R-T Axes : 058 070 059 degrees      QTc Int : 438 ms     Sinus tachycardia   Otherwise normal ECG   When compared with ECG of 27-MAY-2023 20:35,   No significant change was found   Confirmed by Abel SHUKLA MD ([id number]) on 5/28/2023 10:19:55 AM     Referred By: [email or web address]    SELF           Confirmed By:Abel SHUKLA MD       Latest Reference Range & Units 05/27/23 18:30 05/27/23 20:47 05/28/23 05:20   Troponin I High Sensitivity 0.0 - 60.0 pg/mL 163.2 (H) 163.0 (H) 135.4 (H)   (H): Data is abnormally high    Toxicology:  Positive for amphetamines.    The patient was started on aspirin, and benzodiazepine therapy.    Cardiology was consulted for further evaluation and management.  My recommendations were admission for further monitoring.  She was started on aspirin, Plavix, Lovenox, and diltiazem therapy.  Since her admission, she has been hemodynamically stable, but with bouts of anxiety and  tachycardia.  Troponin I levels have dropped from 163.2 to 135.4.      Past Medical History:  Past Medical History:   Diagnosis Date    Anemia     Chronic constipation          History reviewed. No pertinent surgical history.      History reviewed. No pertinent family history.      Social History     Socioeconomic History    Marital status:    Tobacco Use    Smoking status: Never    Smokeless tobacco: Never   Substance and Sexual Activity    Alcohol use: Yes     Comment: occasion    Drug use: Yes     Types: Cocaine         Medications:  Current Facility-Administered Medications   Medication Dose Route Frequency Provider Last Rate Last Admin    ALPRAZolam tablet 0.25 mg  0.25 mg Oral Q8H PRN Melvin Ordaz MD   0.25 mg at 05/28/23 0757    aspirin chewable tablet 81 mg  81 mg Oral Daily Melvin Ordaz MD   81 mg at 05/28/23 1013    diltiaZEM 24 hr capsule 120 mg  120 mg Oral Daily Melvin Ordaz MD   120 mg at 05/28/23 0542    enoxaparin injection 60 mg  1 mg/kg Subcutaneous Q12H (prophylaxis, 0900/2100) Melvin Ordaz MD   60 mg at 05/28/23 0806    melatonin tablet 6 mg  6 mg Oral Nightly PRN Melvin Ordaz MD        nitroGLYCERIN 0.4 MG/HR TD PT24 1 patch  1 patch Transdermal Daily Melvin Ordaz MD   1 patch at 05/28/23 0556    ondansetron injection 4 mg  4 mg Intravenous Q8H PRN Melvin Ordaz MD        sodium chloride 0.9% flush 10 mL  10 mL Intravenous PRN Melvin Ordaz MD           Allergies:  Review of patient's allergies indicates:   Allergen Reactions    Dimetapp cold and flu      Other reaction(s): hives    Dimetapp [brompheniramine-pseudoephedrin]     Stevia [stevioside (bulk)] Hives        ROS    Vital Signs (Most Recent):  Temp: 98 °F (36.7 °C) (05/28/23 0436)  Pulse: (!) 126 (05/28/23 0706)  Resp: 18 (05/28/23 0436)  BP: 108/64 (05/28/23 0706)  SpO2: 98 % (05/28/23 0706) Vital Signs (24h Range):  Temp:  [97.9 °F (36.6 °C)-98.1 °F (36.7 °C)] 98 °F (36.7  °C)  Pulse:  [] 126  Resp:  [18-27] 18  SpO2:  [98 %-100 %] 98 %  BP: (104-130)/(64-90) 108/64     Weight: 55.1 kg (121 lb 8 oz)  Body mass index is 22.96 kg/m².    SpO2: 98 %         Intake/Output Summary (Last 24 hours) at 5/28/2023 1040  Last data filed at 5/28/2023 0451  Gross per 24 hour   Intake 2359 ml   Output --   Net 2359 ml       Physical Exam  General:  Alert and oriented x3.  Mild chest discomfort.  Tachycardic.  Anxious.  Heent:  No jugular venous distention; no carotid bruits.  Lungs:  Clear to auscultation throughout both lung fields.  Heart:  Regular rhythm; tachycardic at 126 beats per minute.  Extremities:  Warm; no pretibial edema.  Distal pulses are normal bilaterally.    Labs: CMP   Recent Labs   Lab 05/27/23  1830 05/28/23  0520    141   K 3.1* 3.9    114*   CO2 19* 24    104   BUN 8 8   CREATININE 1.0 0.7   CALCIUM 10.3 8.6*   PROT 8.7* 6.5   ALBUMIN 4.1 3.0*   BILITOT 0.9 0.8   ALKPHOS 53* 40*   AST 13 16   ALT 17 19   ANIONGAP 12 3*   , CBC   Recent Labs   Lab 05/27/23  1830 05/28/23  0520   WBC 11.38 7.92   HGB 15.4 12.5   HCT 43.2 37.2    230   , and Troponin No results for input(s): TROPONINI in the last 48 hours.

## 2023-05-28 NOTE — H&P
[facility]  Cardiology  History and Physical     Patient Name: Laney M Reyes  MRN: [id number]  Admission Date: 5/27/2023  Code Status: Full Code   Attending Provider: Melvin Ordaz MD   Primary Care Physician: Bao Abad MD  Principal Problem:<principal problem not specified>    Patient information was obtained from patient and ER records.     Subjective:     Chief Complaint:  Chest pain    HPI:  The patient is a 23-year-old lady who presents with symptoms of chest pain.  She presented with tachycardia,and  shortness of breath.  Her EKG did not show acute changes, however she had a significant elevation in her troponin I consistent with a non ST segment elevated MI.    Cardiology Admission Note        Today's Date:  5/28/2023  Patient Name: Laney M Reyes    MRN: [id number]    Code Status: Full Code     Admitting Physician: DARBY Ordaz MD  ______________________________________________________________________________    Reason for Admission:   Non ST segment elevated MI   Heart palpitations/tachycardia    Temp: 98 °F (36.7 °C) (05/28/23 0436)  Pulse: (!) 126 (05/28/23 0706)  Resp: 18 (05/28/23 0436)  BP: 108/64 (05/28/23 0706)  SpO2: 98 % (05/28/23 0706)  Subjective:   The patient is a healthy 23-year-old lady who presents to [facility] with complaints of chest pain, heart palpitations, dizziness, shortness of breath, and numbness throughout her body after ingesting methamphetamine during a birthday celebration.  The patient is a nonsmoker, and does not use recreational drugs.      When she presented to the ER, she was noted to tachycardic.      Test Reason : I21.4,     Vent. Rate : 125 BPM     Atrial Rate : 125 BPM      P-R Int : 116 ms          QRS Dur : 074 ms       QT Int : 304 ms       P-R-T Axes : 058 070 059 degrees      QTc Int : 438 ms     Sinus tachycardia   Otherwise normal ECG   When compared with ECG of 27-MAY-2023 20:35,   No significant change was found   Confirmed by  Abel SHUKLA MD ([id number]) on 5/28/2023 10:19:55 AM     Referred By: [email or web address]    SELF           Confirmed By:Abel SHUKLA MD       Latest Reference Range & Units 05/27/23 18:30 05/27/23 20:47 05/28/23 05:20   Troponin I High Sensitivity 0.0 - 60.0 pg/mL 163.2 (H) 163.0 (H) 135.4 (H)   (H): Data is abnormally high    Toxicology:  Positive for amphetamines.    The patient was started on aspirin, and benzodiazepine therapy.    Cardiology was consulted for further evaluation and management.  My recommendations were admission for further monitoring.  She was started on aspirin, Plavix, Lovenox, and diltiazem therapy.  Since her admission, she has been hemodynamically stable, but with bouts of anxiety and tachycardia.  Troponin I levels have dropped from 163.2 to 135.4.      Past Medical History:  Past Medical History:   Diagnosis Date    Anemia     Chronic constipation          History reviewed. No pertinent surgical history.      History reviewed. No pertinent family history.      Social History     Socioeconomic History    Marital status:    Tobacco Use    Smoking status: Never    Smokeless tobacco: Never   Substance and Sexual Activity    Alcohol use: Yes     Comment: occasion    Drug use: Yes     Types: Cocaine         Medications:  Current Facility-Administered Medications   Medication Dose Route Frequency Provider Last Rate Last Admin    ALPRAZolam tablet 0.25 mg  0.25 mg Oral Q8H PRN Melvin Ordaz MD   0.25 mg at 05/28/23 0757    aspirin chewable tablet 81 mg  81 mg Oral Daily Melvin Ordaz MD   81 mg at 05/28/23 1013    diltiaZEM 24 hr capsule 120 mg  120 mg Oral Daily Melvin Ordaz MD   120 mg at 05/28/23 0542    enoxaparin injection 60 mg  1 mg/kg Subcutaneous Q12H (prophylaxis, 0900/2100) Melvin Ordaz MD   60 mg at 05/28/23 0806    melatonin tablet 6 mg  6 mg Oral Nightly PRN Melvin Ordaz MD        nitroGLYCERIN 0.4 MG/HR TD PT24 1 patch  1 patch Transdermal Daily  Melvin Ordaz MD   1 patch at 05/28/23 0556    ondansetron injection 4 mg  4 mg Intravenous Q8H PRN Melvin Ordaz MD        sodium chloride 0.9% flush 10 mL  10 mL Intravenous PRN Melvin Ordaz MD           Allergies:  Review of patient's allergies indicates:   Allergen Reactions    Dimetapp cold and flu      Other reaction(s): hives    Dimetapp [brompheniramine-pseudoephedrin]     Stevia [stevioside (bulk)] Hives        ROS    Vital Signs (Most Recent):  Temp: 98 °F (36.7 °C) (05/28/23 0436)  Pulse: (!) 126 (05/28/23 0706)  Resp: 18 (05/28/23 0436)  BP: 108/64 (05/28/23 0706)  SpO2: 98 % (05/28/23 0706) Vital Signs (24h Range):  Temp:  [97.9 °F (36.6 °C)-98.1 °F (36.7 °C)] 98 °F (36.7 °C)  Pulse:  [] 126  Resp:  [18-27] 18  SpO2:  [98 %-100 %] 98 %  BP: (104-130)/(64-90) 108/64     Weight: 55.1 kg (121 lb 8 oz)  Body mass index is 22.96 kg/m².    SpO2: 98 %         Intake/Output Summary (Last 24 hours) at 5/28/2023 1040  Last data filed at 5/28/2023 0451  Gross per 24 hour   Intake 2359 ml   Output --   Net 2359 ml       Physical Exam  General:  Alert and oriented x3.  Mild chest discomfort.  Tachycardic.  Anxious.  Heent:  No jugular venous distention; no carotid bruits.  Lungs:  Clear to auscultation throughout both lung fields.  Heart:  Regular rhythm; tachycardic at 126 beats per minute.  Extremities:  Warm; no pretibial edema.  Distal pulses are normal bilaterally.    Labs: CMP   Recent Labs   Lab 05/27/23  1830 05/28/23  0520    141   K 3.1* 3.9    114*   CO2 19* 24    104   BUN 8 8   CREATININE 1.0 0.7   CALCIUM 10.3 8.6*   PROT 8.7* 6.5   ALBUMIN 4.1 3.0*   BILITOT 0.9 0.8   ALKPHOS 53* 40*   AST 13 16   ALT 17 19   ANIONGAP 12 3*   , CBC   Recent Labs   Lab 05/27/23  1830 05/28/23  0520   WBC 11.38 7.92   HGB 15.4 12.5   HCT 43.2 37.2    230   , and Troponin No results for input(s): TROPONINI in the last 48 hours.            Assessment and Plan:   1.  Non ST segment elevated MI:  Secondary to methamphetamine toxicity.  The patient had a significant elevation in her troponin I which is normalizing.  Telemetry reveals sinus tachycardia ranging between 90 and 150 beats per minute.  There has been no ventricular or atrial dysrhythmias.  · Continue with the following medications:  Aspirin, Plavix, Lovenox, nitrates, and diltiazem.  · Continue benzodiazepines for panic attacks.  · Continue close monitoring.    VTE Risk Mitigation (From admission, onward)         Ordered     enoxaparin injection 60 mg  Every 12 hours         05/28/23 0612     IP VTE LOW RISK PATIENT  Once         05/27/23 3336                Melvin Ordaz MD  Cardiology   [facility]

## 2023-05-29 LAB
ALBUMIN SERPL BCP-MCNC: 3.1 G/DL (ref 3.5–5.2)
ALP SERPL-CCNC: 45 U/L (ref 55–135)
ALT SERPL W/O P-5'-P-CCNC: 17 U/L (ref 10–44)
ANION GAP SERPL CALC-SCNC: 3 MMOL/L (ref 8–16)
AST SERPL-CCNC: 11 U/L (ref 10–40)
BASOPHILS # BLD AUTO: 0.05 K/UL (ref 0–0.2)
BASOPHILS NFR BLD: 0.5 % (ref 0–1.9)
BILIRUB SERPL-MCNC: 0.5 MG/DL (ref 0.1–1)
BUN SERPL-MCNC: 14 MG/DL (ref 6–20)
CALCIUM SERPL-MCNC: 9.2 MG/DL (ref 8.7–10.5)
CHLORIDE SERPL-SCNC: 113 MMOL/L (ref 95–110)
CO2 SERPL-SCNC: 24 MMOL/L (ref 23–29)
CREAT SERPL-MCNC: 0.8 MG/DL (ref 0.5–1.4)
DIFFERENTIAL METHOD: ABNORMAL
EOSINOPHIL # BLD AUTO: 0.2 K/UL (ref 0–0.5)
EOSINOPHIL NFR BLD: 2.3 % (ref 0–8)
ERYTHROCYTE [DISTWIDTH] IN BLOOD BY AUTOMATED COUNT: 11.1 % (ref 11.5–14.5)
EST. GFR  (NO RACE VARIABLE): >60 ML/MIN/1.73 M^2
GLUCOSE SERPL-MCNC: 94 MG/DL (ref 70–110)
HCT VFR BLD AUTO: 38.4 % (ref 37–48.5)
HGB BLD-MCNC: 12.6 G/DL (ref 12–16)
IMM GRANULOCYTES # BLD AUTO: 0.02 K/UL (ref 0–0.04)
IMM GRANULOCYTES NFR BLD AUTO: 0.2 % (ref 0–0.5)
LYMPHOCYTES # BLD AUTO: 2.6 K/UL (ref 1–4.8)
LYMPHOCYTES NFR BLD: 25.4 % (ref 18–48)
MCH RBC QN AUTO: 30.1 PG (ref 27–31)
MCHC RBC AUTO-ENTMCNC: 32.8 G/DL (ref 32–36)
MCV RBC AUTO: 92 FL (ref 82–98)
MONOCYTES # BLD AUTO: 0.7 K/UL (ref 0.3–1)
MONOCYTES NFR BLD: 6.6 % (ref 4–15)
NEUTROPHILS # BLD AUTO: 6.6 K/UL (ref 1.8–7.7)
NEUTROPHILS NFR BLD: 65 % (ref 38–73)
NRBC BLD-RTO: 0 /100 WBC
PLATELET # BLD AUTO: 267 K/UL (ref 150–450)
PMV BLD AUTO: 10.1 FL (ref 9.2–12.9)
POTASSIUM SERPL-SCNC: 4.1 MMOL/L (ref 3.5–5.1)
PROT SERPL-MCNC: 6.8 G/DL (ref 6–8.4)
RBC # BLD AUTO: 4.18 M/UL (ref 4–5.4)
SODIUM SERPL-SCNC: 140 MMOL/L (ref 136–145)
TROPONIN I SERPL DL<=0.01 NG/ML-MCNC: 27.9 PG/ML (ref 0–60)
WBC # BLD AUTO: 10.13 K/UL (ref 3.9–12.7)

## 2023-05-29 PROCEDURE — 99900035 HC TECH TIME PER 15 MIN (STAT)

## 2023-05-29 PROCEDURE — 11000001 HC ACUTE MED/SURG PRIVATE ROOM

## 2023-05-29 PROCEDURE — 27000221 HC OXYGEN, UP TO 24 HOURS

## 2023-05-29 PROCEDURE — 25500020 PHARM REV CODE 255: Performed by: INTERNAL MEDICINE

## 2023-05-29 PROCEDURE — 80053 COMPREHEN METABOLIC PANEL: CPT | Performed by: INTERNAL MEDICINE

## 2023-05-29 PROCEDURE — 99900031 HC PATIENT EDUCATION (STAT)

## 2023-05-29 PROCEDURE — 84484 ASSAY OF TROPONIN QUANT: CPT | Performed by: INTERNAL MEDICINE

## 2023-05-29 PROCEDURE — 36415 COLL VENOUS BLD VENIPUNCTURE: CPT | Performed by: INTERNAL MEDICINE

## 2023-05-29 PROCEDURE — 25000003 PHARM REV CODE 250: Performed by: INTERNAL MEDICINE

## 2023-05-29 PROCEDURE — 63600175 PHARM REV CODE 636 W HCPCS: Performed by: INTERNAL MEDICINE

## 2023-05-29 PROCEDURE — 85025 COMPLETE CBC W/AUTO DIFF WBC: CPT | Performed by: INTERNAL MEDICINE

## 2023-05-29 PROCEDURE — 94761 N-INVAS EAR/PLS OXIMETRY MLT: CPT

## 2023-05-29 RX ORDER — ENOXAPARIN SODIUM 100 MG/ML
1 INJECTION SUBCUTANEOUS EVERY 12 HOURS
Status: DISCONTINUED | OUTPATIENT
Start: 2023-05-29 | End: 2023-05-30 | Stop reason: HOSPADM

## 2023-05-29 RX ORDER — ENOXAPARIN SODIUM 100 MG/ML
40 INJECTION SUBCUTANEOUS EVERY 12 HOURS
Status: DISCONTINUED | OUTPATIENT
Start: 2023-05-29 | End: 2023-05-29

## 2023-05-29 RX ORDER — NITROGLYCERIN 40 MG/1
1 PATCH TRANSDERMAL DAILY
Status: DISCONTINUED | OUTPATIENT
Start: 2023-05-29 | End: 2023-05-29

## 2023-05-29 RX ORDER — ALPRAZOLAM 0.25 MG/1
0.25 TABLET ORAL 3 TIMES DAILY PRN
Status: DISCONTINUED | OUTPATIENT
Start: 2023-05-29 | End: 2023-05-30 | Stop reason: HOSPADM

## 2023-05-29 RX ORDER — ACETAMINOPHEN 325 MG/1
650 TABLET ORAL EVERY 6 HOURS PRN
Status: DISCONTINUED | OUTPATIENT
Start: 2023-05-29 | End: 2023-05-30 | Stop reason: HOSPADM

## 2023-05-29 RX ORDER — CLOPIDOGREL BISULFATE 75 MG/1
75 TABLET ORAL DAILY
Status: DISCONTINUED | OUTPATIENT
Start: 2023-05-29 | End: 2023-05-29

## 2023-05-29 RX ORDER — CLOPIDOGREL BISULFATE 75 MG/1
75 TABLET ORAL DAILY
Status: DISCONTINUED | OUTPATIENT
Start: 2023-05-29 | End: 2023-05-30 | Stop reason: HOSPADM

## 2023-05-29 RX ORDER — DILTIAZEM HYDROCHLORIDE 120 MG/1
120 CAPSULE, COATED, EXTENDED RELEASE ORAL DAILY
Status: DISCONTINUED | OUTPATIENT
Start: 2023-05-29 | End: 2023-05-30 | Stop reason: HOSPADM

## 2023-05-29 RX ADMIN — IOHEXOL 100 ML: 350 INJECTION, SOLUTION INTRAVENOUS at 01:05

## 2023-05-29 RX ADMIN — DILTIAZEM HYDROCHLORIDE 120 MG: 120 CAPSULE, COATED, EXTENDED RELEASE ORAL at 12:05

## 2023-05-29 RX ADMIN — ENOXAPARIN SODIUM 60 MG: 100 INJECTION SUBCUTANEOUS at 09:05

## 2023-05-29 RX ADMIN — NITROGLYCERIN TRANSDERMAL SYSTEM 1 PATCH: 0.2 PATCH, EXTENDED RELEASE TRANSDERMAL at 12:05

## 2023-05-29 RX ADMIN — CLOPIDOGREL BISULFATE 75 MG: 75 TABLET ORAL at 09:05

## 2023-05-29 RX ADMIN — ASPIRIN 81 MG CHEWABLE TABLET 81 MG: 81 TABLET CHEWABLE at 09:05

## 2023-05-29 RX ADMIN — Medication 6 MG: at 09:05

## 2023-05-29 NOTE — PLAN OF CARE
NEISHA Estrella reached out regarding the patient's request to speak with me. I spoke to the patient in her room. She expressed to me the concerns she had regarding why she was admitted to the hospital. The patient was transparent about her experience. I was able to provide the patient with support during our discussion. During the interview, the patient expressed that she was open to resources. She was remorseful regarding her experience and use of illicit drugs. She expressed that she just started using earlier this year, and she only used between three to four times during social events.     I asked the patient about her support system, and she expressed that she has good support. We had a discussion about her children, and she strongly informed me that she does not uses around them. The patient explained that she used while she was out at a gathering (social). I asked her where her children were currently located, and she stated that they are with her 's parents,and her spouse, Sukumar, confirmed.      Addendum: I was able to provided the patient with the number to [facility]. I also gave her information to the behavioral services with her healthcare plan, a 211 flyer, and [employer] brochure.

## 2023-05-29 NOTE — PLAN OF CARE
"[facility]  Initial Discharge Assessment       Primary Care Provider: Bao Abad MD    Admission Diagnosis: Panic attack [F41.0]  Cocaine abuse [F14.10]  Elevated troponin [R77.8]  NSTEMI (non-ST elevated myocardial infarction) [I21.4]  Medical clearance for psychiatric admission [Z00.8]    Admission Date: 5/27/2023  Expected Discharge Date:     Transition of Care Barriers: None    Payor: MEDICAID / Plan: [facility] / Product Type: Managed Medicaid /     Extended Emergency Contact Information  Primary Emergency Contact: Arnaud Langston  Address: [address]           [place], [place] [id number] [place] [address]  Home Phone: [phone]  Relation: Father  Secondary Emergency Contact: Reyes,Joel  Mobile Phone: [phone]  Relation: Spouse    Discharge Plan A: Home  Discharge Plan B: Home      [facility] [address] [place] [address] [address] [facility] [address]  Phone: [phone] Fax: [phone]    [id number] [address] [facility] [address] [facility] [address] [facility]  [address]  [facility] [address]  Phone: [phone] Fax: [phone]    [id number] - [place], [place]  [address] AT [facility] [address] [place] [address] [facility]  [address]  [facility] [address]  Phone: [phone] Fax: [phone]      Initial Assessment (most recent)       Adult Discharge Assessment - 05/29/23 0718          Discharge Assessment    Assessment Type Discharge Planning Assessment     Confirmed/corrected address, phone number and insurance Yes     Confirmed Demographics Correct on Facesheet   CM updated contact information    Source of Information patient     When was your last doctors appointment? --   "Months ago."    Does patient/caregiver understand observation status Yes     Communicated SOLANGE with patient/caregiver Yes     Reason For Admission N-STEMI     People in Home child(david), " dependent;spouse     Do you expect to return to your current living situation? Yes     Do you have help at home or someone to help you manage your care at home? --   Patient is independent.    Prior to hospitilization cognitive status: Alert/Oriented     Current cognitive status: Alert/Oriented     Walking or Climbing Stairs --   Patient is independent.    Dressing/Bathing --   Patient is independent.    Home Accessibility stairs to enter home     Number of Stairs, Main Entrance four     Stair Railings, Main Entrance railing on left side (ascending)     Equipment Currently Used at Home none     Readmission within 30 days? No     Patient currently being followed by outpatient case management? No     Do you currently have service(s) that help you manage your care at home? No     Do you take prescription medications? Yes     Do you have prescription coverage? --   Medicaid    Do you have any problems affording any of your prescribed medications? No     Is the patient taking medications as prescribed? yes     Who is going to help you get home at discharge? Patient is independent.     Are you on dialysis? No     Do you take coumadin? No     Discharge Plan A Home     Discharge Plan B Home     Discharge Plan discussed with: Patient     Transition of Care Barriers None        OTHER    Name(s) of People in Home Joel Reyes DCP completed at bedside w/patient.  Patient is AAO x 4. Patient very remorseful for what happened and reason of admission. 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 She states that she was at a family gathering and decided to go out to drink at a bar and was offered illegal drugs, she was told by her uncles friend that it was cocaine. Shortly after, she began not feeling well and decided to come to hospital where she states she was informed she had taken meth. Patient is requesting SW to help with  trauma of her experience. CM will contact SW to speak to patient. Plans are for patient to return home w/spouse and children upon discharge.

## 2023-05-29 NOTE — PLAN OF CARE
Plan of care reiterated at bedside with patient and family. Pt stated seeing hallucinations at the beginning of shift. Pt and family voiced concern of ativan and xanax. Pt stated not wanting to take these medications because of side effects Reiterated substance withdrawal and medication education. PRN melatonin given, slept well throughout night.

## 2023-05-29 NOTE — PROGRESS NOTES
Cardiology Progress Note      Today's Date:  5/29/2023  Patient Name: Laney M Reyes    MRN: [id number]    Code Status: Full Code     Attending Physician: Melvin Ordaz MD   Consulting Physician: DARBY Ordaz MD    Hospital Course:  The patient was admitted on 05/27/2023, with complaints of chest pain, shortness of breath, and tachycardia -- after presumed cocaine ingestion.  The patient found out subsequently, that in fact she ingested methamphetamine.  She was started on DAPT therapy in addition to full-dose Lovenox and diltiazem and nitrates.  Yesterday the patient was feeling perhaps a little bit better but still tachycardic with ventricular rates in the 156 beats per minute range.    Throughout the night, the patient has done well hemodynamically with heart rates down into the 90 beat per minute range.  This morning she is feeling better, but states that she still has intermittent episodes of rapid ventricular rates.  She is mildly dyspneic.      In addition, the patient states that the Xanax made her hallucinate.    Clinical:  Patient is still dyspneic.  She and I began to ambulate up and down the hallway.  After approximately 10 steps, the patient became very dyspneic.  Telemetry revealed a heart rate of 160 beats per minute and she began to feel weak.    Hemodynamic:  Blood pressure has been well controlled.    Telemetry:  Sinus rhythm with intermittent episodes of sinus tachycardia.  No atrial fibrillation, or ventricular dysrhythmias.    Relevant testing:      Review of Systems   Cardiovascular:  Positive for dyspnea on exertion, irregular heartbeat and palpitations. Negative for chest pain, leg swelling, near-syncope and syncope.   Respiratory:  Positive for shortness of breath. Negative for wheezing.      Vital Signs (Most Recent):  Temp: 97.8 °F (36.6 °C) (05/29/23 0619)  Pulse: 97 (05/29/23 0734)  Resp: 18 (05/29/23 0714)  BP: (!) 94/55 (05/29/23 0619)  SpO2: 100 % (05/29/23 0714) Vital Signs  (24h Range):  Temp:  [97.8 °F (36.6 °C)-98.8 °F (37.1 °C)] 97.8 °F (36.6 °C)  Pulse:  [] 97  Resp:  [16-19] 18  SpO2:  [95 %-100 %] 100 %  BP: ()/(48-64) 94/55     Weight: 55.1 kg (121 lb 8 oz)  Body mass index is 22.96 kg/m².    SpO2: 100 %         Intake/Output Summary (Last 24 hours) at 5/29/2023 1110  Last data filed at 5/28/2023 2049  Gross per 24 hour   Intake 840 ml   Output 500 ml   Net 340 ml     Labs:  CMP   Recent Labs   Lab 05/27/23  1830 05/28/23  0520    141   K 3.1* 3.9    114*   CO2 19* 24    104   BUN 8 8   CREATININE 1.0 0.7   CALCIUM 10.3 8.6*   PROT 8.7* 6.5   ALBUMIN 4.1 3.0*   BILITOT 0.9 0.8   ALKPHOS 53* 40*   AST 13 16   ALT 17 19   ANIONGAP 12 3*    and CBC   Recent Labs   Lab 05/27/23  1830 05/28/23  0520   WBC 11.38 7.92   HGB 15.4 12.5   HCT 43.2 37.2    230       Imaging:  Echocardiogram and CTA with PE protocol pending.    Physical Examination:  General:  Alert and oriented.  More comfortable but with continued episodes of tachycardia.  Heent:  No jugular venous distention.  No carotid bruits.  Lungs:  Clear to auscultation throughout both lung fields.  Heart:  Regular rhythm; tachycardic.  Extremities:  No pretibial edema.  Assessment and Plan:   1.  Non ST segment elevated MI:   Suspected secondary to arterial vasospasm and tachycardia.  However can not rule out thrombus.  The patient is becoming dyspneic with minimal activity.    Echocardiogram:  Rule out cardiomyopathy.  CT angiogram:  Rule out pulmonary embolus (mild elevation in D-dimer)  Continue with Adaptic and full-dose anticoagulation therapy.    Continue diltiazem  Continue anxiolytic therapy.  Repeat CMP, CBC, trop.  Lipids in am.    We will make further clinical decisions based on her response, and the results of her testing.  If the patient is not doing significantly better by tomorrow, we will need to consider invasive evaluation although her LONNIE risk is low.

## 2023-05-30 ENCOUNTER — CLINICAL SUPPORT (OUTPATIENT)
Dept: CARDIOLOGY | Facility: HOSPITAL | Age: 23
DRG: 917 | End: 2023-05-30
Attending: INTERNAL MEDICINE
Payer: MEDICAID

## 2023-05-30 VITALS
HEART RATE: 77 BPM | HEIGHT: 61 IN | BODY MASS INDEX: 22.94 KG/M2 | SYSTOLIC BLOOD PRESSURE: 93 MMHG | OXYGEN SATURATION: 98 % | RESPIRATION RATE: 20 BRPM | DIASTOLIC BLOOD PRESSURE: 54 MMHG | WEIGHT: 121.5 LBS | TEMPERATURE: 99 F

## 2023-05-30 PROBLEM — I21.4 NON-STEMI (NON-ST ELEVATED MYOCARDIAL INFARCTION): Status: RESOLVED | Noted: 2023-05-28 | Resolved: 2023-05-30

## 2023-05-30 PROBLEM — R10.9 ABDOMINAL PAIN: Status: RESOLVED | Noted: 2020-05-20 | Resolved: 2023-05-30

## 2023-05-30 PROBLEM — R55 NEAR SYNCOPE: Status: RESOLVED | Noted: 2020-03-11 | Resolved: 2023-05-30

## 2023-05-30 LAB
CHOLEST SERPL-MCNC: 118 MG/DL (ref 120–199)
CHOLEST/HDLC SERPL: 2.4 {RATIO} (ref 2–5)
HDLC SERPL-MCNC: 50 MG/DL (ref 40–75)
HDLC SERPL: 42.4 % (ref 20–50)
LDLC SERPL CALC-MCNC: 54.6 MG/DL (ref 63–159)
NONHDLC SERPL-MCNC: 68 MG/DL
TRIGL SERPL-MCNC: 67 MG/DL (ref 30–150)

## 2023-05-30 PROCEDURE — 94761 N-INVAS EAR/PLS OXIMETRY MLT: CPT

## 2023-05-30 PROCEDURE — 25000003 PHARM REV CODE 250: Performed by: INTERNAL MEDICINE

## 2023-05-30 PROCEDURE — 99900035 HC TECH TIME PER 15 MIN (STAT)

## 2023-05-30 PROCEDURE — 63600175 PHARM REV CODE 636 W HCPCS: Performed by: INTERNAL MEDICINE

## 2023-05-30 PROCEDURE — 99900031 HC PATIENT EDUCATION (STAT)

## 2023-05-30 PROCEDURE — 93306 TTE W/DOPPLER COMPLETE: CPT

## 2023-05-30 PROCEDURE — 36415 COLL VENOUS BLD VENIPUNCTURE: CPT | Performed by: INTERNAL MEDICINE

## 2023-05-30 PROCEDURE — 80061 LIPID PANEL: CPT | Performed by: INTERNAL MEDICINE

## 2023-05-30 RX ORDER — CLOPIDOGREL BISULFATE 75 MG/1
75 TABLET ORAL DAILY
Qty: 30 TABLET | Refills: 11 | Status: SHIPPED | OUTPATIENT
Start: 2023-05-30 | End: 2024-05-29

## 2023-05-30 RX ORDER — NAPROXEN SODIUM 220 MG/1
81 TABLET, FILM COATED ORAL DAILY
Qty: 1 TABLET | Refills: 0 | Status: SHIPPED | OUTPATIENT
Start: 2023-05-30 | End: 2024-05-29

## 2023-05-30 RX ADMIN — ASPIRIN 81 MG CHEWABLE TABLET 81 MG: 81 TABLET CHEWABLE at 11:05

## 2023-05-30 RX ADMIN — CLOPIDOGREL BISULFATE 75 MG: 75 TABLET ORAL at 11:05

## 2023-05-30 RX ADMIN — ONDANSETRON HYDROCHLORIDE 4 MG: 2 SOLUTION INTRAMUSCULAR; INTRAVENOUS at 09:05

## 2023-05-30 NOTE — PLAN OF CARE
Attempted to make follow up with Dr. Valladares's office x 3. No answer. Patient to make own follow up appointment.

## 2023-05-30 NOTE — DISCHARGE SUMMARY
Cardiology Discharge  [facility]    Patient Name: Laney M Reyes    MRN: [id number]    Code Status: Full Code     Attending Physician: Melvin Ordaz MD   Consulting Physician: DARBY Ordaz MD  ____________________________________________________________________    Date of admission:  Admitted to hospital  Date of Discharge:  5/30/2023    Diagnoses:    Problem List Items Addressed This Visit    None  Visit Diagnoses       Elevated troponin    -  Primary    Medical clearance for psychiatric admission        Panic attack        Cocaine abuse        NSTEMI (non-ST elevated myocardial infarction)                Medications:    No current facility-administered medications on file prior to encounter.     Current Outpatient Medications on File Prior to Encounter   Medication Sig Dispense Refill    ibuprofen (ADVIL,MOTRIN) 800 MG tablet Take 1 tablet (800 mg total) by mouth 3 (three) times daily. 30 tablet 0    iron, carbonyl 45 mg Tab Take by mouth.      linaCLOtide (LINZESS) 145 mcg Cap capsule Take 145 mcg by mouth before breakfast.      norelgestromin-ethinyl estradiol 150-35 mcg/24 hr Place 1 patch onto the skin once a week.      PNV no.95/ferrous fum/folic ac (PRENATAL ORAL) Take by mouth.          History and Hospital Course:  The patient was admitted to the hospital on 05/27/23 and is being discharged on 05/30/2023.  She was admitted for acute amphetamine intoxication with symptoms of chest pain, shortness of breath, heart palpitations, and profound weakness.    Troponin I levels were significantly elevated at 163.2.  Her EKG showed sinus tachycardia but no acute ST segment changes.  She was admitted to the floor for a non ST segment elevated MI secondary to amphetamine toxicity.  The patient was started on aspirin, Plavix, Lovenox, nitrates, benzodiazepines, and diltiazem therapy.    The patient tolerated this intervention well, but continued to remain tachycardic until this morning.  The patient states  that her appetite has returned; that her shortness of breath has resolved, and that she is feeling much better.    Last troponin I level on 05/29/2023 27.9.  Chemistries are within the normal limits.  She was noted to be hypokalemic on admission, which resolved.  Her CBC revealed a normal white count and hemoglobin as well as platelets.    Lipids (05/30/2023):  Total cholesterol 118 triglycerides 67 HDL 50 LDL 54.6    Yesterday the patient was short of breath and tachycardic for which a CT angiogram with PE protocol was performed:  Shortness of breath, chest pain     CT/nuclear cardiac exams in previous 12 months: None     TECHNIQUE:  Axial CT images were obtained following administration of intravenous contrast and evaluated with coronal MIP reformatted images.  Iterative reconstruction technique was used.     COMPARISON:  None     FINDINGS:  No evidence of pulmonary embolism identified.  Pulmonary trunk is not enlarged.  Thoracic aorta is normal in caliber.  No mediastinal or hilar lymphadenopathy.  Central airways are clear.  No consolidation or pleural effusion.  Images through the upper abdomen are unremarkable.     Impression:     No evidence of pulmonary embolism or other acute pulmonary process.        Electronically signed by: Miller Ordoñez MD  Date:                                            05/29/2023  Time:                                           16:20    This morning, the patient ambulated up and down the hallway without symptoms of chest pain or shortness of breath.  Telemetry revealed sinus tachycardia with a maximum heart rate of 122 beats per minute.  Her heart rate promptly returned to normal.    She is clinically and hemodynamically stable.    Vital Signs (Most Recent):  Temp: 98.7 °F (37.1 °C) (05/30/23 0756)  Pulse: 77 (05/30/23 0756)  Resp: 20 (05/30/23 0756)  BP: (!) 93/54 (05/30/23 0756)  SpO2: 98 % (05/30/23 0756) Vital Signs (24h Range):  Temp:  [97.7 °F (36.5 °C)-98.7 °F (37.1 °C)] 98.7 °F  (37.1 °C)  Pulse:  [] 77  Resp:  [16-22] 20  SpO2:  [94 %-99 %] 98 %  BP: ()/(54-73) 93/54     Weight: 55.1 kg (121 lb 8 oz)  Body mass index is 22.96 kg/m².    SpO2: 98 %         Intake/Output Summary (Last 24 hours) at 5/30/2023 1053  Last data filed at 5/30/2023 0600  Gross per 24 hour   Intake 1740 ml   Output --   Net 1740 ml       Physical Exam  General:  Alert and oriented x3.  She is in no distress.  Heent:  Pupils are equal round and reactive; they measure approximately 2 mm.  Lungs:  Clear to auscultation throughout both lung fields.  Heart:  Regular rate rhythm; no appreciable murmurs gallops clicks or rubs.  Extremities:  Warm; without edema; distal pulses are 2+ bilaterally.    Imaging:   Echocardiogram pending.  ______________________________________________________________________    Assessment and Plan:   1. Non ST segment elevated MI:  Uncomplicated to date.  Patient now doing well.  She is ready for discharge.  Aspirin 81 mg q.d..  Discontinue Lovenox   Discontinue diltiazem  Echocardiogram will be performed within a few hours.  Outpatient stress testing.         Medication List        ASK your doctor about these medications      ibuprofen 800 MG tablet  Commonly known as: ADVIL,MOTRIN  Take 1 tablet (800 mg total) by mouth 3 (three) times daily.     iron, carbonyl 45 mg Tab     linaCLOtide 145 mcg Cap capsule  Commonly known as: LINZESS     norelgestromin-ethinyl estradiol 150-35 mcg/24 hr     PRENATAL ORAL               Follow up:  Schedule outpatient stress testing.

## 2023-05-30 NOTE — PLAN OF CARE
Problem: Adult Inpatient Plan of Care  Goal: Plan of Care Review  Outcome: Ongoing, Progressing  Goal: Patient-Specific Goal (Individualized)  Outcome: Ongoing, Progressing  Goal: Absence of Hospital-Acquired Illness or Injury  Outcome: Ongoing, Progressing  Goal: Optimal Comfort and Wellbeing  Outcome: Ongoing, Progressing  Goal: Readiness for Transition of Care  Outcome: Ongoing, Progressing     Problem: Activity and Energy Impairment (Anxiety Signs/Symptoms)  Goal: Optimized Energy Level (Anxiety Signs/Symptoms)  Outcome: Ongoing, Progressing     Problem: Cognitive Impairment (Anxiety Signs/Symptoms)  Goal: Optimized Cognitive Function (Anxiety Signs/Symptoms)  Outcome: Ongoing, Progressing     Problem: Mood Impairment (Anxiety Signs/Symptoms)  Goal: Improved Mood Symptoms (Anxiety Signs/Symptoms)  Outcome: Ongoing, Progressing     Problem: Sleep Impairment (Anxiety Signs/Symptoms)  Goal: Improved Sleep (Anxiety Signs/Symptoms)  Outcome: Ongoing, Progressing     Problem: Social, Occupational or Functional Impairment (Anxiety Signs/Symptoms)  Goal: Enhanced Social, Occupational or Functional Skills (Anxiety Signs/Symptoms)  Outcome: Ongoing, Progressing     Problem: Somatic Disturbance (Anxiety Signs/Symptoms)  Goal: Improved Somatic Symptoms (Anxiety Signs/Symptoms)  Outcome: Ongoing, Progressing

## 2023-05-30 NOTE — HOSPITAL COURSE
The patient was admitted to the hospital on 05/27/23 and is being discharged on 05/30/2023.  She was admitted for acute amphetamine intoxication with symptoms of chest pain, shortness of breath, heart palpitations, and profound weakness.    Troponin I levels were significantly elevated at 163.2.  Her EKG showed sinus tachycardia but no acute ST segment changes.  She was admitted to the floor for a non ST segment elevated MI secondary to amphetamine toxicity.  The patient was started on aspirin, Plavix, Lovenox, nitrates, benzodiazepines, and diltiazem therapy.    The patient tolerated this intervention well, but continued to remain tachycardic until this morning.  The patient states that her appetite has returned; that her shortness of breath has resolved, and that she is feeling much better.    Last troponin I level on 05/29/2023 27.9.  Chemistries are within the normal limits.  She was noted to be hypokalemic on admission, which resolved.  Her CBC revealed a normal white count and hemoglobin as well as platelets.    Lipids (05/30/2023):  Total cholesterol 118 triglycerides 67 HDL 50 LDL 54.6    Yesterday the patient was short of breath and tachycardic for which a CT angiogram with PE protocol was performed:  Shortness of breath, chest pain     CT/nuclear cardiac exams in previous 12 months: None     TECHNIQUE:  Axial CT images were obtained following administration of intravenous contrast and evaluated with coronal MIP reformatted images.  Iterative reconstruction technique was used.     COMPARISON:  None     FINDINGS:  No evidence of pulmonary embolism identified.  Pulmonary trunk is not enlarged.  Thoracic aorta is normal in caliber.  No mediastinal or hilar lymphadenopathy.  Central airways are clear.  No consolidation or pleural effusion.  Images through the upper abdomen are unremarkable.     Impression:     No evidence of pulmonary embolism or other acute pulmonary process.        Electronically signed by:  Miller Ordoñez MD  Date:                                            05/29/2023  Time:                                           16:20    This morning, the patient ambulated up and down the hallway without symptoms of chest pain or shortness of breath.  Telemetry revealed sinus tachycardia with a maximum heart rate of 122 beats per minute.  Her heart rate promptly returned to normal.    She is clinically and hemodynamically stable.

## 2023-05-30 NOTE — DISCHARGE SUMMARY
[facility]  Cardiology  Discharge Summary      Patient Name: Laney M Reyes  MRN: [id number]  Admission Date: 5/27/2023  Hospital Length of Stay: 1 days  Discharge Date and Time:  05/30/2023 11:16 AM  Attending Physician: Melvin Ordaz MD    Discharging Provider: Melvin Ordaz MD  Primary Care Physician: Verito Valladares MD    HPI:   Cardiology Admission Note        Today's Date:  5/28/2023  Patient Name: Laney M Reyes    MRN: [id number]    Code Status: Full Code     Admitting Physician: DARBY Ordaz MD  ______________________________________________________________________________    Reason for Admission:   Non ST segment elevated MI   Heart palpitations/tachycardia    Temp: 98 °F (36.7 °C) (05/28/23 0436)  Pulse: (!) 126 (05/28/23 0706)  Resp: 18 (05/28/23 0436)  BP: 108/64 (05/28/23 0706)  SpO2: 98 % (05/28/23 0706)  Subjective:   The patient is a healthy 23-year-old lady who presents to [facility] with complaints of chest pain, heart palpitations, dizziness, shortness of breath, and numbness throughout her body after ingesting methamphetamine during a birthday celebration.  The patient is a nonsmoker, and does not use recreational drugs.      When she presented to the ER, she was noted to tachycardic.      Test Reason : I21.4,     Vent. Rate : 125 BPM     Atrial Rate : 125 BPM      P-R Int : 116 ms          QRS Dur : 074 ms       QT Int : 304 ms       P-R-T Axes : 058 070 059 degrees      QTc Int : 438 ms     Sinus tachycardia   Otherwise normal ECG   When compared with ECG of 27-MAY-2023 20:35,   No significant change was found   Confirmed by Abel SHUKLA MD ([id number]) on 5/28/2023 10:19:55 AM     Referred By: [email or web address]    SELF           Confirmed By:Abel SHUKLA MD       Latest Reference Range & Units 05/27/23 18:30 05/27/23 20:47 05/28/23 05:20   Troponin I High Sensitivity 0.0 - 60.0 pg/mL 163.2 (H) 163.0 (H) 135.4 (H)   (H): Data is abnormally high    Toxicology:  Positive for  amphetamines.    The patient was started on aspirin, and benzodiazepine therapy.    Cardiology was consulted for further evaluation and management.  My recommendations were admission for further monitoring.  She was started on aspirin, Plavix, Lovenox, and diltiazem therapy.  Since her admission, she has been hemodynamically stable, but with bouts of anxiety and tachycardia.  Troponin I levels have dropped from 163.2 to 135.4.      Past Medical History:  Past Medical History:   Diagnosis Date    Anemia     Chronic constipation          History reviewed. No pertinent surgical history.      History reviewed. No pertinent family history.      Social History     Socioeconomic History    Marital status:    Tobacco Use    Smoking status: Never    Smokeless tobacco: Never   Substance and Sexual Activity    Alcohol use: Yes     Comment: occasion    Drug use: Yes     Types: Cocaine         Medications:  Current Facility-Administered Medications   Medication Dose Route Frequency Provider Last Rate Last Admin    ALPRAZolam tablet 0.25 mg  0.25 mg Oral Q8H PRN Melvin Ordaz MD   0.25 mg at 05/28/23 0757    aspirin chewable tablet 81 mg  81 mg Oral Daily Melvin Ordaz MD   81 mg at 05/28/23 1013    diltiaZEM 24 hr capsule 120 mg  120 mg Oral Daily Melvin Ordaz MD   120 mg at 05/28/23 0542    enoxaparin injection 60 mg  1 mg/kg Subcutaneous Q12H (prophylaxis, 0900/2100) Melvin Ordaz MD   60 mg at 05/28/23 0806    melatonin tablet 6 mg  6 mg Oral Nightly PRN Melvin Ordaz MD        nitroGLYCERIN 0.4 MG/HR TD PT24 1 patch  1 patch Transdermal Daily Melvin Ordaz MD   1 patch at 05/28/23 0556    ondansetron injection 4 mg  4 mg Intravenous Q8H PRN Melvin Ordaz MD        sodium chloride 0.9% flush 10 mL  10 mL Intravenous PRN Melvin Ordaz MD           Allergies:  Review of patient's allergies indicates:   Allergen Reactions    Dimetapp cold and flu      Other  reaction(s): hives    Dimetapp [brompheniramine-pseudoephedrin]     Stevia [stevioside (bulk)] Hives        ROS    Vital Signs (Most Recent):  Temp: 98 °F (36.7 °C) (05/28/23 0436)  Pulse: (!) 126 (05/28/23 0706)  Resp: 18 (05/28/23 0436)  BP: 108/64 (05/28/23 0706)  SpO2: 98 % (05/28/23 0706) Vital Signs (24h Range):  Temp:  [97.9 °F (36.6 °C)-98.1 °F (36.7 °C)] 98 °F (36.7 °C)  Pulse:  [] 126  Resp:  [18-27] 18  SpO2:  [98 %-100 %] 98 %  BP: (104-130)/(64-90) 108/64     Weight: 55.1 kg (121 lb 8 oz)  Body mass index is 22.96 kg/m².    SpO2: 98 %         Intake/Output Summary (Last 24 hours) at 5/28/2023 1040  Last data filed at 5/28/2023 0451  Gross per 24 hour   Intake 2359 ml   Output --   Net 2359 ml       Physical Exam  General:  Alert and oriented x3.  Mild chest discomfort.  Tachycardic.  Anxious.  Heent:  No jugular venous distention; no carotid bruits.  Lungs:  Clear to auscultation throughout both lung fields.  Heart:  Regular rhythm; tachycardic at 126 beats per minute.  Extremities:  Warm; no pretibial edema.  Distal pulses are normal bilaterally.    Labs: CMP   Recent Labs   Lab 05/27/23 1830 05/28/23  0520    141   K 3.1* 3.9    114*   CO2 19* 24    104   BUN 8 8   CREATININE 1.0 0.7   CALCIUM 10.3 8.6*   PROT 8.7* 6.5   ALBUMIN 4.1 3.0*   BILITOT 0.9 0.8   ALKPHOS 53* 40*   AST 13 16   ALT 17 19   ANIONGAP 12 3*   , CBC   Recent Labs   Lab 05/27/23  1830 05/28/23  0520   WBC 11.38 7.92   HGB 15.4 12.5   HCT 43.2 37.2    230   , and Troponin No results for input(s): TROPONINI in the last 48 hours.          * No surgery found *     Indwelling Lines/Drains at time of discharge:  Lines/Drains/Airways     None                 Hospital Course:  The patient was admitted to the hospital on 05/27/23 and is being discharged on 05/30/2023.  She was admitted for acute amphetamine intoxication with symptoms of chest pain, shortness of breath, heart palpitations, and profound  weakness.    Troponin I levels were significantly elevated at 163.2.  Her EKG showed sinus tachycardia but no acute ST segment changes.  She was admitted to the floor for a non ST segment elevated MI secondary to amphetamine toxicity.  The patient was started on aspirin, Plavix, Lovenox, nitrates, benzodiazepines, and diltiazem therapy.    The patient tolerated this intervention well, but continued to remain tachycardic until this morning.  The patient states that her appetite has returned; that her shortness of breath has resolved, and that she is feeling much better.    Last troponin I level on 05/29/2023 27.9.  Chemistries are within the normal limits.  She was noted to be hypokalemic on admission, which resolved.  Her CBC revealed a normal white count and hemoglobin as well as platelets.    Lipids (05/30/2023):  Total cholesterol 118 triglycerides 67 HDL 50 LDL 54.6    Yesterday the patient was short of breath and tachycardic for which a CT angiogram with PE protocol was performed:  Shortness of breath, chest pain     CT/nuclear cardiac exams in previous 12 months: None     TECHNIQUE:  Axial CT images were obtained following administration of intravenous contrast and evaluated with coronal MIP reformatted images.  Iterative reconstruction technique was used.     COMPARISON:  None     FINDINGS:  No evidence of pulmonary embolism identified.  Pulmonary trunk is not enlarged.  Thoracic aorta is normal in caliber.  No mediastinal or hilar lymphadenopathy.  Central airways are clear.  No consolidation or pleural effusion.  Images through the upper abdomen are unremarkable.     Impression:     No evidence of pulmonary embolism or other acute pulmonary process.        Electronically signed by: Miller Ordoñez MD  Date:                                            05/29/2023  Time:                                           16:20    This morning, the patient ambulated up and down the hallway without symptoms of chest pain or  shortness of breath.  Telemetry revealed sinus tachycardia with a maximum heart rate of 122 beats per minute.  Her heart rate promptly returned to normal.    She is clinically and hemodynamically stable.      Goals of Care Treatment Preferences:  Code Status: Full Code      Consults:     Significant Diagnostic Studies:   EKG: Sinus tachycardia no acute ST segment depression or elevations.  CTA with PE protocol:  Negative.  Echocardiogram:  Normal chamber dimensions; preserved LV function with an EF of 65% without regional wall motion abnormalities.    Pending Diagnostic Studies:     Procedure Component Value Units Date/Time    Echo [[id number]] Resulted: 05/30/23 1101    Order Status: Sent Lab Status: In process Updated: 05/30/23 1101          Final Active Diagnoses:      Problems Resolved During this Admission:    Diagnosis Date Noted Date Resolved POA    PRINCIPAL PROBLEM:  Non-STEMI (non-ST elevated myocardial infarction) [I21.4] 05/28/2023 05/30/2023 Unknown     No new Assessment & Plan notes have been filed under this hospital service since the last note was generated.  Service: Cardiology      Discharged Condition: fair    Disposition: Home or Self Care    Follow Up:   Follow-up Information     Verito Valladares MD Follow up.    Specialty: Internal Medicine  Contact information:  [address] [address]  [phone]             Terence Swanson MD Follow up on 6/7/2023.    Specialty: Cardiology  Why: 10:40am. Will need outpatient stress test.  Contact information:  [facility] [facility] [address]  [phone]                       Patient Instructions:   No discharge procedures on file.  Medications:  Reconciled Home Medications:      Medication List      START taking these medications    aspirin 81 MG Chew  Take 1 tablet (81 mg total) by mouth once daily.     clopidogreL 75 mg tablet  Commonly known as: PLAVIX  Take 1 tablet (75 mg total) by mouth once  daily.        STOP taking these medications    ibuprofen 800 MG tablet  Commonly known as: ADVIL,MOTRIN     iron, carbonyl 45 mg Tab     linaCLOtide 145 mcg Cap capsule  Commonly known as: LINZESS     PRENATAL ORAL        ASK your doctor about these medications    norelgestromin-ethinyl estradiol 150-35 mcg/24 hr  Place 1 patch onto the skin once a week.            Time spent on the discharge of patient: 30 minutes    Melvin Ordaz MD  Cardiology  [facility]

## 2023-05-31 LAB
AV INDEX (PROSTH): 0.94
AV MEAN GRADIENT: 2 MMHG
AV PEAK GRADIENT: 6 MMHG
AV VALVE AREA: 2.15 CM2
AV VELOCITY RATIO: 0.83
BSA FOR ECHO PROCEDURE: 1.54 M2
CV ECHO LV RWT: 0.38 CM
DOP CALC AO PEAK VEL: 1.21 M/S
DOP CALC AO VTI: 20.5 CM
DOP CALC LVOT AREA: 2.3 CM2
DOP CALC LVOT DIAMETER: 1.71 CM
DOP CALC LVOT PEAK VEL: 1.01 M/S
DOP CALC LVOT STROKE VOLUME: 44.07 CM3
DOP CALCLVOT PEAK VEL VTI: 19.2 CM
E WAVE DECELERATION TIME: 247.11 MSEC
E/A RATIO: 1.77
ECHO LV POSTERIOR WALL: 0.72 CM (ref 0.6–1.1)
EJECTION FRACTION: 60 %
FRACTIONAL SHORTENING: 35 % (ref 28–44)
INTERVENTRICULAR SEPTUM: 0.94 CM (ref 0.6–1.1)
IVC DIAMETER: 1.29 CM
LEFT ATRIUM SIZE: 2.32 CM
LEFT INTERNAL DIMENSION IN SYSTOLE: 2.48 CM (ref 2.1–4)
LEFT VENTRICLE DIASTOLIC VOLUME INDEX: 40.28 ML/M2
LEFT VENTRICLE DIASTOLIC VOLUME: 61.63 ML
LEFT VENTRICLE MASS INDEX: 59 G/M2
LEFT VENTRICLE SYSTOLIC VOLUME INDEX: 14.4 ML/M2
LEFT VENTRICLE SYSTOLIC VOLUME: 21.98 ML
LEFT VENTRICULAR INTERNAL DIMENSION IN DIASTOLE: 3.79 CM (ref 3.5–6)
LEFT VENTRICULAR MASS: 89.99 G
LV SEPTAL E/E' RATIO: 6.57 M/S
LVOT MG: 2.1 MMHG
LVOT MV: 0.64 CM/S
MV PEAK A VEL: 0.52 M/S
MV PEAK E VEL: 0.92 M/S
MV STENOSIS PRESSURE HALF TIME: 71.66 MS
MV VALVE AREA P 1/2 METHOD: 3.07 CM2
PISA TR MAX VEL: 2.11 M/S
PV MV: 0.54 M/S
PV PEAK VELOCITY: 0.74 CM/S
RA PRESSURE: 3 MMHG
TDI SEPTAL: 0.14 M/S
TR MAX PG: 18 MMHG
TV REST PULMONARY ARTERY PRESSURE: 21 MMHG

## 2023-06-03 ENCOUNTER — NURSE TRIAGE (OUTPATIENT)
Dept: ADMINISTRATIVE | Facility: CLINIC | Age: 23
End: 2023-06-03
Payer: MEDICAID

## 2023-06-04 NOTE — TELEPHONE ENCOUNTER
"Pt calls c/o cough and chest pain after experiencing an NSTEMI on 5/27/23. She states that she is worried and unsure if these symptoms are normal. She describes the feeling in her chest as a "pressure" that has lasted for longer than 5 minutes. Pt is advised to call  now per Care Advice. She verbalizes understanding and is instructed to call back with any new/worsening sxs, questions, or concerns.   Reason for Disposition   [1] Chest pain lasts > 5 minutes AND [2] history of heart disease (i.e., angina, heart attack, heart failure, bypass surgery, takes nitroglycerin)    Additional Information   Negative: SEVERE difficulty breathing (e.g., struggling for each breath, speaks in single words)   Negative: Difficult to awaken or acting confused (e.g., disoriented, slurred speech)   Negative: Shock suspected (e.g., cold/pale/clammy skin, too weak to stand, low BP, rapid pulse)   Negative: Passed out (i.e., lost consciousness, collapsed and was not responding)   Negative: [1] Chest pain lasts > 5 minutes AND [2] age > 44   Negative: [1] Chest pain lasts > 5 minutes AND [2] age > 30 AND [3] one or more cardiac risk factors (e.g., diabetes, high blood pressure, high cholesterol, smoker, or strong family history of heart disease)    Protocols used: Chest Pain-A-AH    "

## 2023-08-14 ENCOUNTER — HOSPITAL ENCOUNTER (EMERGENCY)
Facility: HOSPITAL | Age: 23
Discharge: HOME | End: 2023-08-15
Attending: STUDENT IN AN ORGANIZED HEALTH CARE EDUCATION/TRAINING PROGRAM
Payer: MEDICAID

## 2023-08-14 DIAGNOSIS — R07.9 CHEST PAIN: ICD-10-CM

## 2023-08-14 DIAGNOSIS — R00.2 PALPITATIONS: Primary | ICD-10-CM

## 2023-08-14 LAB
ALBUMIN SERPL BCP-MCNC: 3.6 G/DL (ref 3.5–5.2)
ALP SERPL-CCNC: 51 U/L (ref 55–135)
ALT SERPL W/O P-5'-P-CCNC: 13 U/L (ref 10–44)
ANION GAP SERPL CALC-SCNC: 9 MMOL/L (ref 8–16)
AST SERPL-CCNC: 11 U/L (ref 10–40)
BASOPHILS # BLD AUTO: 0.03 K/UL (ref 0–0.2)
BASOPHILS NFR BLD: 0.3 % (ref 0–1.9)
BILIRUB SERPL-MCNC: 0.2 MG/DL (ref 0.1–1)
BUN SERPL-MCNC: 16 MG/DL (ref 6–20)
CALCIUM SERPL-MCNC: 8.8 MG/DL (ref 8.7–10.5)
CHLORIDE SERPL-SCNC: 106 MMOL/L (ref 95–110)
CO2 SERPL-SCNC: 24 MMOL/L (ref 23–29)
CREAT SERPL-MCNC: 0.9 MG/DL (ref 0.5–1.4)
DIFFERENTIAL METHOD: ABNORMAL
EOSINOPHIL # BLD AUTO: 0 K/UL (ref 0–0.5)
EOSINOPHIL NFR BLD: 0.4 % (ref 0–8)
ERYTHROCYTE [DISTWIDTH] IN BLOOD BY AUTOMATED COUNT: 11.4 % (ref 11.5–14.5)
EST. GFR  (NO RACE VARIABLE): >60 ML/MIN/1.73 M^2
GLUCOSE SERPL-MCNC: 133 MG/DL (ref 70–110)
HCT VFR BLD AUTO: 35.2 % (ref 37–48.5)
HGB BLD-MCNC: 11.9 G/DL (ref 12–16)
IMM GRANULOCYTES # BLD AUTO: 0.03 K/UL (ref 0–0.04)
IMM GRANULOCYTES NFR BLD AUTO: 0.3 % (ref 0–0.5)
LYMPHOCYTES # BLD AUTO: 2.5 K/UL (ref 1–4.8)
LYMPHOCYTES NFR BLD: 27.2 % (ref 18–48)
MCH RBC QN AUTO: 30.4 PG (ref 27–31)
MCHC RBC AUTO-ENTMCNC: 33.8 G/DL (ref 32–36)
MCV RBC AUTO: 90 FL (ref 82–98)
MONOCYTES # BLD AUTO: 0.6 K/UL (ref 0.3–1)
MONOCYTES NFR BLD: 6.2 % (ref 4–15)
NEUTROPHILS # BLD AUTO: 6.1 K/UL (ref 1.8–7.7)
NEUTROPHILS NFR BLD: 65.6 % (ref 38–73)
NRBC BLD-RTO: 0 /100 WBC
PLATELET # BLD AUTO: 206 K/UL (ref 150–450)
PMV BLD AUTO: 10.4 FL (ref 9.2–12.9)
POTASSIUM SERPL-SCNC: 3.4 MMOL/L (ref 3.5–5.1)
PROT SERPL-MCNC: 7.2 G/DL (ref 6–8.4)
RBC # BLD AUTO: 3.91 M/UL (ref 4–5.4)
SODIUM SERPL-SCNC: 139 MMOL/L (ref 136–145)
TROPONIN I SERPL DL<=0.01 NG/ML-MCNC: <4 PG/ML (ref 0–60)
WBC # BLD AUTO: 9.3 K/UL (ref 3.9–12.7)

## 2023-08-14 PROCEDURE — 85025 COMPLETE CBC W/AUTO DIFF WBC: CPT | Performed by: STUDENT IN AN ORGANIZED HEALTH CARE EDUCATION/TRAINING PROGRAM

## 2023-08-14 PROCEDURE — 93005 ELECTROCARDIOGRAM TRACING: CPT

## 2023-08-14 PROCEDURE — 99284 EMERGENCY DEPT VISIT MOD MDM: CPT | Mod: 25

## 2023-08-14 PROCEDURE — 93010 ELECTROCARDIOGRAM REPORT: CPT | Mod: ,,, | Performed by: INTERNAL MEDICINE

## 2023-08-14 PROCEDURE — 93010 EKG 12-LEAD: ICD-10-PCS | Mod: ,,, | Performed by: INTERNAL MEDICINE

## 2023-08-14 PROCEDURE — 25000003 PHARM REV CODE 250: Performed by: STUDENT IN AN ORGANIZED HEALTH CARE EDUCATION/TRAINING PROGRAM

## 2023-08-14 PROCEDURE — 96360 HYDRATION IV INFUSION INIT: CPT

## 2023-08-14 PROCEDURE — 36415 COLL VENOUS BLD VENIPUNCTURE: CPT | Performed by: STUDENT IN AN ORGANIZED HEALTH CARE EDUCATION/TRAINING PROGRAM

## 2023-08-14 PROCEDURE — 84484 ASSAY OF TROPONIN QUANT: CPT | Performed by: STUDENT IN AN ORGANIZED HEALTH CARE EDUCATION/TRAINING PROGRAM

## 2023-08-14 PROCEDURE — 80053 COMPREHEN METABOLIC PANEL: CPT | Performed by: STUDENT IN AN ORGANIZED HEALTH CARE EDUCATION/TRAINING PROGRAM

## 2023-08-14 RX ORDER — LORAZEPAM 2 MG/ML
1 INJECTION INTRAMUSCULAR
Status: DISCONTINUED | OUTPATIENT
Start: 2023-08-14 | End: 2023-08-15 | Stop reason: HOSPADM

## 2023-08-14 RX ADMIN — SODIUM CHLORIDE 1000 ML: 9 INJECTION, SOLUTION INTRAVENOUS at 11:08

## 2023-08-15 VITALS
HEART RATE: 99 BPM | TEMPERATURE: 99 F | OXYGEN SATURATION: 99 % | RESPIRATION RATE: 16 BRPM | SYSTOLIC BLOOD PRESSURE: 142 MMHG | DIASTOLIC BLOOD PRESSURE: 84 MMHG

## 2023-08-15 LAB — TROPONIN I SERPL DL<=0.01 NG/ML-MCNC: 5.5 PG/ML (ref 0–60)

## 2023-08-15 PROCEDURE — 84484 ASSAY OF TROPONIN QUANT: CPT | Performed by: STUDENT IN AN ORGANIZED HEALTH CARE EDUCATION/TRAINING PROGRAM

## 2023-08-15 PROCEDURE — 36415 COLL VENOUS BLD VENIPUNCTURE: CPT | Performed by: STUDENT IN AN ORGANIZED HEALTH CARE EDUCATION/TRAINING PROGRAM

## 2023-08-15 NOTE — ED PROVIDER NOTES
History  Chief Complaint   Patient presents with    Palpitations     Pt reports she took a Hydrocodone and took one hit of a blunt at 9pm. Began having chest pain and palpitations. Reports she has a hx. MI in May 2023.     Chest Pain     23-year-old female with history of NSTEMI presents for evaluation chest pain associated with palpitations.  Patient states she took an old hydrocodone pill for toothache.  A little while after patient said she took a hit of some weed.  Thereafter patient began to experience chest discomfort which she described as burning and palpitations.  Patient symptoms grossly reminiscent prior NSTEMI in May of this year.  Patient also noted to be anxious due to this and dealing with lots of stress in her personal life.  All other systems reviewed are negative        Past Medical History:   Diagnosis Date    Anemia     Chronic constipation        No past surgical history on file.    No family history on file.    Social History     Tobacco Use    Smoking status: Never    Smokeless tobacco: Never   Substance Use Topics    Alcohol use: Yes     Comment: occasion    Drug use: Yes     Types: Cocaine       ROS  Review of Systems   Cardiovascular:  Positive for chest pain and palpitations.   Psychiatric/Behavioral:  Positive for agitation.        Physical Exam  BP (!) 142/84   Pulse 99   Temp 98.6 °F (37 °C) (Oral)   Resp 16   LMP 08/01/2023 (Approximate)   SpO2 99%   Breastfeeding No   Physical Exam    Constitutional: She appears well-developed and well-nourished. She is cooperative.   HENT:   Head: Normocephalic and atraumatic.   Eyes: Conjunctivae, EOM and lids are normal. Pupils are equal, round, and reactive to light.   Neck: Phonation normal.   Normal range of motion.  Cardiovascular:  Regular rhythm and intact distal pulses.   Tachycardia present.         Pulmonary/Chest: Breath sounds normal. No stridor. No respiratory distress.   Abdominal: Abdomen is soft. She exhibits no distension.    Musculoskeletal:         General: No edema. Normal range of motion.      Cervical back: Normal range of motion.     Neurological: She is alert and oriented to person, place, and time.   Skin: Skin is warm and dry.   Psychiatric: Her speech is normal. Her mood appears anxious.               Labs Reviewed   CBC W/ AUTO DIFFERENTIAL - Abnormal; Notable for the following components:       Result Value    RBC 3.91 (*)     Hemoglobin 11.9 (*)     Hematocrit 35.2 (*)     RDW 11.4 (*)     All other components within normal limits   COMPREHENSIVE METABOLIC PANEL - Abnormal; Notable for the following components:    Potassium 3.4 (*)     Glucose 133 (*)     Alkaline Phosphatase 51 (*)     All other components within normal limits   TROPONIN I HIGH SENSITIVITY   TROPONIN I HIGH SENSITIVITY     EKG Readings: (Independently Interpreted)   Initial Reading: No STEMI. Rhythm: Sinus Tachycardia. Heart Rate: 121.                      Procedures             Medical Decision Making  23-year-old female with history of NSTEMI presents for evaluation chest pain associated with palpitations.  Patient states she took an old hydrocodone pill for toothache.  A little while after patient said she took a hit of some weed.  Thereafter patient began to experience chest discomfort which she described as burning and palpitations.  Patient symptoms grossly reminiscent prior NSTEMI in May of this year.  Patient also noted to be anxious due to this and dealing with lots of stress in her personal life.  All other systems reviewed are negative.  On physical exam patient is a generally anxious.  EKG is a sinus rhythm at a rate of 121, no acute ischemia identified.  Troponin x2 negative.  Labs reviewed unremarkable.  Patient given fluids with symptomatic improvement.  Do suspect today's episode is likely pain induced as she reports significant stressors in her life.  Patient has been advised to take medication in the past but did not like the way it made  her feel.  Patient also advised to seek follow-up with a psychiatrist but notes that she is not done this yet, advised her to seek out care.  No suicidal or homicidal ideation endorsed. Return precautions were given    Amount and/or Complexity of Data Reviewed  Labs: ordered.    Risk  Prescription drug management.                    Clinical Impression  The primary encounter diagnosis was Palpitations. A diagnosis of Chest pain was also pertinent to this visit.     Hiren Brown MD  08/15/23 0456

## 2024-06-08 ENCOUNTER — HOSPITAL ENCOUNTER (EMERGENCY)
Facility: HOSPITAL | Age: 24
Discharge: HOME | End: 2024-06-09
Attending: STUDENT IN AN ORGANIZED HEALTH CARE EDUCATION/TRAINING PROGRAM

## 2024-06-08 DIAGNOSIS — A08.4 VIRAL GASTROENTERITIS: Primary | ICD-10-CM

## 2024-06-08 LAB
ALBUMIN SERPL BCP-MCNC: 4.3 G/DL (ref 3.5–5.2)
ALP SERPL-CCNC: 52 U/L (ref 55–135)
ALT SERPL W/O P-5'-P-CCNC: 12 U/L (ref 10–44)
ANION GAP SERPL CALC-SCNC: 11 MMOL/L (ref 3–11)
AST SERPL-CCNC: 12 U/L (ref 10–40)
BASOPHILS # BLD AUTO: 0.02 K/UL (ref 0–0.2)
BASOPHILS NFR BLD: 0.1 % (ref 0–1.9)
BILIRUB SERPL-MCNC: 0.8 MG/DL (ref 0.1–1)
BUN SERPL-MCNC: 15 MG/DL (ref 6–20)
CALCIUM SERPL-MCNC: 9.8 MG/DL (ref 8.7–10.5)
CHLORIDE SERPL-SCNC: 108 MMOL/L (ref 95–110)
CO2 SERPL-SCNC: 22 MMOL/L (ref 23–29)
CREAT SERPL-MCNC: 1 MG/DL (ref 0.5–1.4)
DIFFERENTIAL METHOD BLD: ABNORMAL
EOSINOPHIL # BLD AUTO: 0.1 K/UL (ref 0–0.5)
EOSINOPHIL NFR BLD: 0.8 % (ref 0–8)
ERYTHROCYTE [DISTWIDTH] IN BLOOD BY AUTOMATED COUNT: 11.5 % (ref 11.5–14.5)
EST. GFR  (NO RACE VARIABLE): >60 ML/MIN/1.73 M^2
GLUCOSE SERPL-MCNC: 150 MG/DL (ref 70–110)
HCT VFR BLD AUTO: 41.1 % (ref 37–48.5)
HGB BLD-MCNC: 14.2 G/DL (ref 12–16)
IMM GRANULOCYTES # BLD AUTO: 0.04 K/UL (ref 0–0.04)
IMM GRANULOCYTES NFR BLD AUTO: 0.3 % (ref 0–0.5)
LIPASE SERPL-CCNC: 29 U/L (ref 13–75)
LYMPHOCYTES # BLD AUTO: 1.1 K/UL (ref 1–4.8)
LYMPHOCYTES NFR BLD: 7.8 % (ref 18–48)
MCH RBC QN AUTO: 30.5 PG (ref 27–31)
MCHC RBC AUTO-ENTMCNC: 34.5 G/DL (ref 32–36)
MCV RBC AUTO: 88 FL (ref 82–98)
MONOCYTES # BLD AUTO: 1 K/UL (ref 0.3–1)
MONOCYTES NFR BLD: 6.7 % (ref 4–15)
NEUTROPHILS # BLD AUTO: 11.9 K/UL (ref 1.8–7.7)
NEUTROPHILS NFR BLD: 84.3 % (ref 38–73)
NRBC BLD-RTO: 0 /100 WBC
PLATELET # BLD AUTO: 245 K/UL (ref 150–450)
PMV BLD AUTO: 10.3 FL (ref 9.2–12.9)
POTASSIUM SERPL-SCNC: 3.6 MMOL/L (ref 3.5–5.1)
PROT SERPL-MCNC: 7.7 G/DL (ref 6–8.4)
RBC # BLD AUTO: 4.65 M/UL (ref 4–5.4)
SODIUM SERPL-SCNC: 141 MMOL/L (ref 136–145)
WBC # BLD AUTO: 14.17 K/UL (ref 3.9–12.7)

## 2024-06-08 PROCEDURE — 96372 THER/PROPH/DIAG INJ SC/IM: CPT | Mod: 59 | Performed by: STUDENT IN AN ORGANIZED HEALTH CARE EDUCATION/TRAINING PROGRAM

## 2024-06-08 PROCEDURE — 36415 COLL VENOUS BLD VENIPUNCTURE: CPT | Performed by: STUDENT IN AN ORGANIZED HEALTH CARE EDUCATION/TRAINING PROGRAM

## 2024-06-08 PROCEDURE — 99284 EMERGENCY DEPT VISIT MOD MDM: CPT | Mod: 25

## 2024-06-08 PROCEDURE — 63600175 PHARM REV CODE 636 W HCPCS: Performed by: STUDENT IN AN ORGANIZED HEALTH CARE EDUCATION/TRAINING PROGRAM

## 2024-06-08 PROCEDURE — 80053 COMPREHEN METABOLIC PANEL: CPT | Performed by: STUDENT IN AN ORGANIZED HEALTH CARE EDUCATION/TRAINING PROGRAM

## 2024-06-08 PROCEDURE — 25000003 PHARM REV CODE 250: Performed by: STUDENT IN AN ORGANIZED HEALTH CARE EDUCATION/TRAINING PROGRAM

## 2024-06-08 PROCEDURE — 96374 THER/PROPH/DIAG INJ IV PUSH: CPT

## 2024-06-08 PROCEDURE — 83690 ASSAY OF LIPASE: CPT | Performed by: STUDENT IN AN ORGANIZED HEALTH CARE EDUCATION/TRAINING PROGRAM

## 2024-06-08 PROCEDURE — 96361 HYDRATE IV INFUSION ADD-ON: CPT

## 2024-06-08 PROCEDURE — 85025 COMPLETE CBC W/AUTO DIFF WBC: CPT | Performed by: STUDENT IN AN ORGANIZED HEALTH CARE EDUCATION/TRAINING PROGRAM

## 2024-06-08 RX ORDER — PROCHLORPERAZINE EDISYLATE 5 MG/ML
10 INJECTION INTRAMUSCULAR; INTRAVENOUS
Status: COMPLETED | OUTPATIENT
Start: 2024-06-08 | End: 2024-06-09

## 2024-06-08 RX ORDER — ONDANSETRON HYDROCHLORIDE 2 MG/ML
4 INJECTION, SOLUTION INTRAVENOUS
Status: COMPLETED | OUTPATIENT
Start: 2024-06-08 | End: 2024-06-08

## 2024-06-08 RX ORDER — DICYCLOMINE HYDROCHLORIDE 10 MG/ML
20 INJECTION INTRAMUSCULAR
Status: COMPLETED | OUTPATIENT
Start: 2024-06-08 | End: 2024-06-08

## 2024-06-08 RX ADMIN — ONDANSETRON 4 MG: 2 INJECTION INTRAMUSCULAR; INTRAVENOUS at 10:06

## 2024-06-08 RX ADMIN — SODIUM CHLORIDE 1000 ML: 9 INJECTION, SOLUTION INTRAVENOUS at 10:06

## 2024-06-08 RX ADMIN — DICYCLOMINE HYDROCHLORIDE 20 MG: 10 INJECTION, SOLUTION INTRAMUSCULAR at 10:06

## 2024-06-08 NOTE — Clinical Note
"Jayshree"Laney" Reyes was seen and treated in our emergency department on 6/8/2024.  She may return to work on 06/11/2024.       If you have any questions or concerns, please don't hesitate to call.      Hiren Brown MD"

## 2024-06-09 VITALS
HEART RATE: 102 BPM | OXYGEN SATURATION: 99 % | SYSTOLIC BLOOD PRESSURE: 102 MMHG | WEIGHT: 129 LBS | DIASTOLIC BLOOD PRESSURE: 61 MMHG | HEIGHT: 62 IN | RESPIRATION RATE: 16 BRPM | BODY MASS INDEX: 23.74 KG/M2 | TEMPERATURE: 98 F

## 2024-06-09 PROCEDURE — 96375 TX/PRO/DX INJ NEW DRUG ADDON: CPT

## 2024-06-09 PROCEDURE — 63600175 PHARM REV CODE 636 W HCPCS: Performed by: STUDENT IN AN ORGANIZED HEALTH CARE EDUCATION/TRAINING PROGRAM

## 2024-06-09 RX ORDER — ONDANSETRON 4 MG/1
4 TABLET, ORALLY DISINTEGRATING ORAL EVERY 6 HOURS PRN
Qty: 20 TABLET | Refills: 0 | Status: SHIPPED | OUTPATIENT
Start: 2024-06-09 | End: 2024-06-14

## 2024-06-09 RX ORDER — DICYCLOMINE HYDROCHLORIDE 20 MG/1
20 TABLET ORAL 2 TIMES DAILY
Qty: 14 TABLET | Refills: 0 | Status: SHIPPED | OUTPATIENT
Start: 2024-06-09 | End: 2024-06-16

## 2024-06-09 RX ADMIN — PROCHLORPERAZINE EDISYLATE 10 MG: 5 INJECTION INTRAMUSCULAR; INTRAVENOUS at 12:06

## 2024-06-09 NOTE — ED PROVIDER NOTES
"  History  Chief Complaint   Patient presents with    Vomiting     Patient reports vomiting with dizziness and abdominal cramping that started today. She states her kids recently had "a stomach bug"     24-year-old female presents for evaluation of vomiting associated with abdominal cramping.  Symptoms started earlier today.  Patient's children are ill, also noted to be vomiting.  All other systems reviewed and unremarkable.  No medication taken at home for symptom relief.        Past Medical History:   Diagnosis Date    Anemia     Chronic constipation        History reviewed. No pertinent surgical history.    No family history on file.    Social History     Tobacco Use    Smoking status: Never    Smokeless tobacco: Never   Substance Use Topics    Alcohol use: Yes     Comment: occasion    Drug use: Yes     Types: Cocaine       ROS  Review of Systems   Gastrointestinal:  Positive for abdominal pain and vomiting.       Physical Exam  /61   Pulse 102   Temp 98 °F (36.7 °C) (Oral)   Resp 16   Ht 5' 2" (1.575 m)   Wt 58.5 kg (129 lb)   LMP 05/27/2024   SpO2 99%   Breastfeeding No   BMI 23.59 kg/m²   Physical Exam    Constitutional: She appears well-developed and well-nourished. She is cooperative.   HENT:   Head: Normocephalic and atraumatic.   Eyes: Conjunctivae, EOM and lids are normal. Pupils are equal, round, and reactive to light.   Neck: Phonation normal.   Normal range of motion.  Cardiovascular:  Normal rate, regular rhythm and intact distal pulses.           Pulmonary/Chest: Breath sounds normal. No respiratory distress.   Abdominal: Abdomen is soft. There is abdominal tenderness.   Musculoskeletal:      Cervical back: Normal range of motion.     Neurological: She is alert and oriented to person, place, and time.   Skin: Skin is warm and dry.   Psychiatric: She has a normal mood and affect. Her speech is normal and behavior is normal.               Labs Reviewed   CBC W/ AUTO DIFFERENTIAL - " Abnormal; Notable for the following components:       Result Value    WBC 14.17 (*)     Gran # (ANC) 11.9 (*)     Gran % 84.3 (*)     Lymph % 7.8 (*)     All other components within normal limits   COMPREHENSIVE METABOLIC PANEL - Abnormal; Notable for the following components:    CO2 22 (*)     Glucose 150 (*)     Alkaline Phosphatase 52 (*)     All other components within normal limits   LIPASE           Imaging Results    None                     Procedures             Medical Decision Making  Suspect symptoms related to acute viral gastroenteritis.  Will obtain labs and give medication for symptom relief.    Amount and/or Complexity of Data Reviewed  Labs: ordered. Decision-making details documented in ED Course.    Risk  Prescription drug management.               ED Course as of 06/10/24 0605   Sat Jun 08, 2024   2317 WBC(!): 14.17 [NA]   Sun Jun 09, 2024   0015 Patient with symptom improvement.  Patient ultimately requesting to be discharged as she feels better.  Will give prescription for the outpatient setting. [NA]      ED Course User Index  [NA] Hiren Brown MD       DISCHARGE NOTE:       Laney M Reyes's  results have been reviewed with her.  She has been counseled regarding her diagnosis, treatment, and plan.  She verbally conveys understanding and agreement of the signs, symptoms, diagnosis, treatment and prognosis and additionally agrees to follow up as discussed.  She also agrees with the care-plan and conveys that all of her questions have been answered.  I have also provided discharge instructions for her that include: educational information regarding their diagnosis and treatment, and list of reasons why they would want to return to the ED prior to their follow-up appointment, should her condition change. She has been provided with education for proper emergency department utilization.      CLINICAL IMPRESSION:         1. Viral gastroenteritis              PLAN:   1. Discharge  2.   Discharge  Medication List as of 6/9/2024 12:31 AM        START taking these medications    Details   dicyclomine (BENTYL) 20 mg tablet Take 1 tablet (20 mg total) by mouth 2 (two) times daily. for 7 days, Starting Sun 6/9/2024, Until Sun 6/16/2024, Normal      ondansetron (ZOFRAN-ODT) 4 MG TbDL Take 1 tablet (4 mg total) by mouth every 6 (six) hours as needed (Nausea/vomiting)., Starting Sun 6/9/2024, Until Fri 6/14/2024 at 2359, Normal           3. Verito Valladares MD  [facility] [address]  [phone]    Schedule an appointment as soon as possible for a visit   As needed          Hiren Brown MD  06/10/24 0605

## 2024-06-19 ENCOUNTER — HOSPITAL ENCOUNTER (EMERGENCY)
Facility: HOSPITAL | Age: 24
Discharge: HOME | End: 2024-06-19
Attending: EMERGENCY MEDICINE

## 2024-06-19 VITALS
SYSTOLIC BLOOD PRESSURE: 116 MMHG | HEART RATE: 86 BPM | RESPIRATION RATE: 18 BRPM | WEIGHT: 129 LBS | HEIGHT: 62 IN | DIASTOLIC BLOOD PRESSURE: 64 MMHG | BODY MASS INDEX: 23.74 KG/M2 | TEMPERATURE: 98 F | OXYGEN SATURATION: 97 %

## 2024-06-19 DIAGNOSIS — R10.12 LEFT UPPER QUADRANT ABDOMINAL PAIN: Primary | ICD-10-CM

## 2024-06-19 LAB
ALBUMIN SERPL BCP-MCNC: 3.9 G/DL (ref 3.5–5.2)
ALP SERPL-CCNC: 64 U/L (ref 55–135)
ALT SERPL W/O P-5'-P-CCNC: 21 U/L (ref 10–44)
ANION GAP SERPL CALC-SCNC: 11 MMOL/L (ref 3–11)
AST SERPL-CCNC: 12 U/L (ref 10–40)
B-HCG UR QL: NEGATIVE
BACTERIA #/AREA URNS HPF: NEGATIVE /HPF
BASOPHILS # BLD AUTO: 0.03 K/UL (ref 0–0.2)
BASOPHILS NFR BLD: 0.5 % (ref 0–1.9)
BILIRUB SERPL-MCNC: 0.5 MG/DL (ref 0.1–1)
BILIRUB UR QL STRIP: NEGATIVE
BUN SERPL-MCNC: 17 MG/DL (ref 6–20)
CALCIUM SERPL-MCNC: 8.9 MG/DL (ref 8.7–10.5)
CHLORIDE SERPL-SCNC: 105 MMOL/L (ref 95–110)
CLARITY UR: CLEAR
CO2 SERPL-SCNC: 22 MMOL/L (ref 23–29)
COLOR UR: YELLOW
CREAT SERPL-MCNC: 0.7 MG/DL (ref 0.5–1.4)
DIFFERENTIAL METHOD BLD: NORMAL
EOSINOPHIL # BLD AUTO: 0.1 K/UL (ref 0–0.5)
EOSINOPHIL NFR BLD: 1.2 % (ref 0–8)
ERYTHROCYTE [DISTWIDTH] IN BLOOD BY AUTOMATED COUNT: 11.6 % (ref 11.5–14.5)
EST. GFR  (NO RACE VARIABLE): >60 ML/MIN/1.73 M^2
GLUCOSE SERPL-MCNC: 90 MG/DL (ref 70–110)
GLUCOSE UR QL STRIP: NEGATIVE
HCT VFR BLD AUTO: 37.8 % (ref 37–48.5)
HGB BLD-MCNC: 12.7 G/DL (ref 12–16)
HGB UR QL STRIP: NEGATIVE
HYALINE CASTS #/AREA URNS LPF: 0 /LPF
IMM GRANULOCYTES # BLD AUTO: 0.01 K/UL (ref 0–0.04)
IMM GRANULOCYTES NFR BLD AUTO: 0.2 % (ref 0–0.5)
KETONES UR QL STRIP: NEGATIVE
LEUKOCYTE ESTERASE UR QL STRIP: ABNORMAL
LIPASE SERPL-CCNC: 32 U/L (ref 13–75)
LYMPHOCYTES # BLD AUTO: 1.5 K/UL (ref 1–4.8)
LYMPHOCYTES NFR BLD: 25.3 % (ref 18–48)
MCH RBC QN AUTO: 30 PG (ref 27–31)
MCHC RBC AUTO-ENTMCNC: 33.6 G/DL (ref 32–36)
MCV RBC AUTO: 89 FL (ref 82–98)
MICROSCOPIC COMMENT: ABNORMAL
MONOCYTES # BLD AUTO: 0.4 K/UL (ref 0.3–1)
MONOCYTES NFR BLD: 6.1 % (ref 4–15)
NEUTROPHILS # BLD AUTO: 3.9 K/UL (ref 1.8–7.7)
NEUTROPHILS NFR BLD: 66.7 % (ref 38–73)
NITRITE UR QL STRIP: NEGATIVE
NRBC BLD-RTO: 0 /100 WBC
PH UR STRIP: 6 [PH] (ref 5–8)
PLATELET # BLD AUTO: 212 K/UL (ref 150–450)
PMV BLD AUTO: 10.2 FL (ref 9.2–12.9)
POTASSIUM SERPL-SCNC: 4.3 MMOL/L (ref 3.5–5.1)
PROT SERPL-MCNC: 7.2 G/DL (ref 6–8.4)
PROT UR QL STRIP: NEGATIVE
RBC # BLD AUTO: 4.23 M/UL (ref 4–5.4)
RBC #/AREA URNS HPF: 0 /HPF (ref 0–4)
SODIUM SERPL-SCNC: 138 MMOL/L (ref 136–145)
SP GR UR STRIP: 1.02 (ref 1–1.03)
SQUAMOUS #/AREA URNS HPF: 7 /HPF
URN SPEC COLLECT METH UR: ABNORMAL
UROBILINOGEN UR STRIP-ACNC: 1 EU/DL
WBC # BLD AUTO: 5.88 K/UL (ref 3.9–12.7)
WBC #/AREA URNS HPF: 7 /HPF (ref 0–5)

## 2024-06-19 PROCEDURE — 96375 TX/PRO/DX INJ NEW DRUG ADDON: CPT

## 2024-06-19 PROCEDURE — 83690 ASSAY OF LIPASE: CPT | Performed by: EMERGENCY MEDICINE

## 2024-06-19 PROCEDURE — 81025 URINE PREGNANCY TEST: CPT | Performed by: EMERGENCY MEDICINE

## 2024-06-19 PROCEDURE — 99284 EMERGENCY DEPT VISIT MOD MDM: CPT | Mod: 25

## 2024-06-19 PROCEDURE — 96374 THER/PROPH/DIAG INJ IV PUSH: CPT

## 2024-06-19 PROCEDURE — 63600175 PHARM REV CODE 636 W HCPCS: Performed by: EMERGENCY MEDICINE

## 2024-06-19 PROCEDURE — 85025 COMPLETE CBC W/AUTO DIFF WBC: CPT | Performed by: EMERGENCY MEDICINE

## 2024-06-19 PROCEDURE — 25000003 PHARM REV CODE 250: Performed by: EMERGENCY MEDICINE

## 2024-06-19 PROCEDURE — 96361 HYDRATE IV INFUSION ADD-ON: CPT

## 2024-06-19 PROCEDURE — 80053 COMPREHEN METABOLIC PANEL: CPT | Performed by: EMERGENCY MEDICINE

## 2024-06-19 PROCEDURE — 81000 URINALYSIS NONAUTO W/SCOPE: CPT | Performed by: EMERGENCY MEDICINE

## 2024-06-19 PROCEDURE — 36415 COLL VENOUS BLD VENIPUNCTURE: CPT | Performed by: EMERGENCY MEDICINE

## 2024-06-19 RX ORDER — ONDANSETRON HYDROCHLORIDE 2 MG/ML
4 INJECTION, SOLUTION INTRAVENOUS
Status: COMPLETED | OUTPATIENT
Start: 2024-06-19 | End: 2024-06-19

## 2024-06-19 RX ORDER — ONDANSETRON 4 MG/1
4 TABLET, ORALLY DISINTEGRATING ORAL EVERY 8 HOURS PRN
Qty: 8 TABLET | Refills: 0 | Status: SHIPPED | OUTPATIENT
Start: 2024-06-19

## 2024-06-19 RX ORDER — DICYCLOMINE HYDROCHLORIDE 10 MG/1
10 CAPSULE ORAL
COMMUNITY

## 2024-06-19 RX ORDER — KETOROLAC TROMETHAMINE 30 MG/ML
15 INJECTION, SOLUTION INTRAMUSCULAR; INTRAVENOUS
Status: COMPLETED | OUTPATIENT
Start: 2024-06-19 | End: 2024-06-19

## 2024-06-19 RX ORDER — DICYCLOMINE HYDROCHLORIDE 20 MG/1
20 TABLET ORAL 2 TIMES DAILY PRN
Qty: 20 TABLET | Refills: 0 | Status: SHIPPED | OUTPATIENT
Start: 2024-06-19 | End: 2024-07-19

## 2024-06-19 RX ADMIN — SODIUM CHLORIDE 1000 ML: 9 INJECTION, SOLUTION INTRAVENOUS at 07:06

## 2024-06-19 RX ADMIN — ONDANSETRON 4 MG: 2 INJECTION INTRAMUSCULAR; INTRAVENOUS at 07:06

## 2024-06-19 RX ADMIN — KETOROLAC TROMETHAMINE 15 MG: 30 INJECTION, SOLUTION INTRAMUSCULAR; INTRAVENOUS at 07:06

## 2024-06-19 NOTE — Clinical Note
"Jayshree Sotoy" Reyes was seen and treated in our emergency department on 6/19/2024.  She may return to work on 06/20/2024.       If you have any questions or concerns, please don't hesitate to call.      alecia TURNER    "

## 2024-06-19 NOTE — ED PROVIDER NOTES
"EMERGENCY DEPARTMENT HISTORY AND PHYSICAL EXAM     This note is dictated on M*Modal word recognition program.  There are word recognition mistakes and grammatical errors that are occasionally missed on review.     Date: 6/19/2024   Patient Name: Laney M Reyes       History of Presenting Illness      Chief Complaint   Patient presents with    Abdominal Pain     Patient reports she woke up pain a "stabbing" pain to left abdomen radiating to back at 3 am. Denies urinary problems. Last bowel movement 30 min pta.   She took Bentyl at onset of pain with no relief.         0735   Laney M Reyes is a 24 y.o. female with PMHX of constipation who presents to the emergency department C/O abd pain.    Patient reports crampy upper and left upper quadrant abdominal pain that woke her up from sleep around 2:00 a.m..  States it feels sharp and comes in waves.  Associated with nausea.  No vomiting.  Denies prior episodes.  Denies recent constipation.  Denies urinary changes.  No vaginal bleeding or discharge.  No history of abdominal surgeries.      PCP: Verito Valladares MD        Current Facility-Administered Medications   Medication Dose Route Frequency Provider Last Rate Last Admin    sodium chloride 0.9% bolus 1,000 mL 1,000 mL  1,000 mL Intravenous ED 1 Time Eric Hernández  mL/hr at 06/19/24 0749 1,000 mL at 06/19/24 0749     Current Outpatient Medications   Medication Sig Dispense Refill    aspirin 81 MG Chew Take 1 tablet (81 mg total) by mouth once daily. 1 tablet 0    clopidogreL (PLAVIX) 75 mg tablet Take 1 tablet (75 mg total) by mouth once daily. 30 tablet 11    dicyclomine (BENTYL) 10 MG capsule Take 10 mg by mouth 4 (four) times daily before meals and nightly.      dicyclomine (BENTYL) 20 mg tablet Take 1 tablet (20 mg total) by mouth 2 (two) times daily as needed (Abdominal Cramps/Pain). 20 tablet 0    norelgestromin-ethinyl estradiol 150-35 mcg/24 hr Place 1 patch onto the skin once a week.      " "ondansetron (ZOFRAN-ODT) 4 MG TbDL Take 1 tablet (4 mg total) by mouth every 8 (eight) hours as needed (Nasuea). 8 tablet 0           Past History     Past Medical History:   Past Medical History:   Diagnosis Date    Anemia     Chronic constipation         Past Surgical History:   History reviewed. No pertinent surgical history.     Family History:   No family history on file.     Social History:   Social History     Tobacco Use    Smoking status: Never    Smokeless tobacco: Never   Substance Use Topics    Alcohol use: Yes     Comment: occasion    Drug use: Yes     Types: Cocaine        Allergies:   Review of patient's allergies indicates:   Allergen Reactions    Compazine [prochlorperazine]      Patient reports she did not like the way it made her feel last time she was given this medication, and she does not want it again.     Dimetapp cold and flu      Other reaction(s): hives    Dimetapp [brompheniramine-pseudoephedrin]     Stevia [stevioside (bulk)] Hives          Review of Systems   Review of Systems   See HPI for pertinent positives and negatives       Physical Exam     Vitals:    06/19/24 0723   BP: 116/64   BP Location: Left arm   Patient Position: Lying   Pulse: 86   Resp: 18   Temp: 98.4 °F (36.9 °C)   SpO2: 97%   Weight: 58.5 kg (129 lb)   Height: 5' 2" (1.575 m)      Physical Exam  Vitals and nursing note reviewed.   Constitutional:       General: She is not in acute distress.     Appearance: Normal appearance. She is not ill-appearing.   HENT:      Head: Normocephalic and atraumatic.      Right Ear: External ear normal.      Left Ear: External ear normal.      Nose: Nose normal. No congestion or rhinorrhea.      Mouth/Throat:      Mouth: Mucous membranes are moist.   Eyes:      Conjunctiva/sclera: Conjunctivae normal.      Pupils: Pupils are equal, round, and reactive to light.   Cardiovascular:      Rate and Rhythm: Normal rate and regular rhythm.   Pulmonary:      Effort: Pulmonary effort is normal. " No respiratory distress.   Abdominal:      General: Abdomen is flat. Bowel sounds are normal.      Palpations: Abdomen is soft.      Tenderness: There is abdominal tenderness in the epigastric area and left upper quadrant. There is no guarding or rebound.      Hernia: No hernia is present.   Musculoskeletal:         General: No deformity. Normal range of motion.      Cervical back: Normal range of motion. No rigidity.   Skin:     General: Skin is dry.   Neurological:      General: No focal deficit present.      Mental Status: She is alert and oriented to person, place, and time. Mental status is at baseline.   Psychiatric:         Mood and Affect: Mood normal.         Behavior: Behavior normal.              Diagnostic Study Results      Labs -   Recent Results (from the past 12 hour(s))   Pregnancy, urine rapid    Collection Time: 06/19/24  7:29 AM   Result Value Ref Range    Preg Test, Ur Negative    Urinalysis    Collection Time: 06/19/24  7:29 AM   Result Value Ref Range    Specimen UA Urine, Clean Catch     Color, UA Yellow Yellow, Straw, [place]    Appearance, UA Clear Clear    pH, UA 6.0 5.0 - 8.0    Specific Gravity, UA 1.025 1.005 - 1.030    Protein, UA Negative Negative    Glucose, UA Negative Negative    Ketones, UA Negative Negative    Bilirubin (UA) Negative Negative    Occult Blood UA Negative Negative    Nitrite, UA Negative Negative    Urobilinogen, UA 1.0 <2.0 EU/dL    Leukocytes, UA 1+ (A) Negative   Urinalysis Microscopic    Collection Time: 06/19/24  7:29 AM   Result Value Ref Range    RBC, UA 0 0 - 4 /hpf    WBC, UA 7 (H) 0 - 5 /hpf    Bacteria Negative None-Occ /hpf    Squam Epithel, UA 7 /hpf    Hyaline Casts, UA 0.0 (A) 0-1/lpf /lpf    Microscopic Comment SEE COMMENT    CBC Auto Differential    Collection Time: 06/19/24  7:56 AM   Result Value Ref Range    WBC 5.88 3.90 - 12.70 K/uL    RBC 4.23 4.00 - 5.40 M/uL    Hemoglobin 12.7 12.0 - 16.0 g/dL    Hematocrit 37.8 37.0 - 48.5 %    MCV 89 82 -  98 fL    MCH 30.0 27.0 - 31.0 pg    MCHC 33.6 32.0 - 36.0 g/dL    RDW 11.6 11.5 - 14.5 %    Platelets 212 150 - 450 K/uL    MPV 10.2 9.2 - 12.9 fL    Immature Granulocytes 0.2 0.0 - 0.5 %    Gran # (ANC) 3.9 1.8 - 7.7 K/uL    Immature Grans (Abs) 0.01 0.00 - 0.04 K/uL    Lymph # 1.5 1.0 - 4.8 K/uL    Mono # 0.4 0.3 - 1.0 K/uL    Eos # 0.1 0.0 - 0.5 K/uL    Baso # 0.03 0.00 - 0.20 K/uL    nRBC 0 0 /100 WBC    Gran % 66.7 38.0 - 73.0 %    Lymph % 25.3 18.0 - 48.0 %    Mono % 6.1 4.0 - 15.0 %    Eosinophil % 1.2 0.0 - 8.0 %    Basophil % 0.5 0.0 - 1.9 %    Differential Method Automated    Comprehensive Metabolic Panel    Collection Time: 06/19/24  7:56 AM   Result Value Ref Range    Sodium 138 136 - 145 mmol/L    Potassium 4.3 3.5 - 5.1 mmol/L    Chloride 105 95 - 110 mmol/L    CO2 22 (L) 23 - 29 mmol/L    Glucose 90 70 - 110 mg/dL    BUN 17 6 - 20 mg/dL    Creatinine 0.7 0.5 - 1.4 mg/dL    Calcium 8.9 8.7 - 10.5 mg/dL    Total Protein 7.2 6.0 - 8.4 g/dL    Albumin 3.9 3.5 - 5.2 g/dL    Total Bilirubin 0.5 0.1 - 1.0 mg/dL    Alkaline Phosphatase 64 55 - 135 U/L    AST 12 10 - 40 U/L    ALT 21 10 - 44 U/L    eGFR >60.0 >60 mL/min/1.73 m^2    Anion Gap 11 3 - 11 mmol/L   Lipase    Collection Time: 06/19/24  7:56 AM   Result Value Ref Range    Lipase 32 13 - 75 U/L        Radiologic Studies -    No orders to display        Medications given in the ED-   Medications   sodium chloride 0.9% bolus 1,000 mL 1,000 mL (1,000 mLs Intravenous New Bag 6/19/24 0749)   ketorolac injection 15 mg (15 mg Intravenous Given 6/19/24 0748)   ondansetron injection 4 mg (4 mg Intravenous Given 6/19/24 0749)           Medical Decision Making    I am the first provider for this patient.     I reviewed the vital signs, available nursing notes, past medical history, past surgical history, family history and social history.     Vital Signs:  Reviewed the patient's vital signs.     Pulse Oximetry Analysis and Interpretation:    97% on Room Air,  normal        External Test Results (Pertinent to encounter):    Records Reviewed: Nursing Notes, Current Prescription Medications, Old Medical Records, and External Medical Records     History Obtained By: Patient    Provider Notes: Laney M Reyes is a 24 y.o. female with abd pain    Co-morbidities Considered: none    Differential Diagnosis:  Pancreatitis, colitis, gastritis, renal colic, biliary colic ovarian cyst, ovarian torsion, cramps      ED Course:    8:43 AM  Patient feels better after medication in emergency department.  Abdomen is not peritonitic.  Labs reviewed and unremarkable.  Would defer imaging at this time.  Patient instructed to return to ED if symptoms recur, become more severe at which point we will re-evaluate patient and possibly pursue imaging.  At this time with normal vital signs and benign labs now indication of acute abdominal emergency.  Patient comfortable and agreeable with discharge.         Problems Addressed:  Abdominal pain    Procedures:   Procedures       Diagnosis and Disposition     Critical Care:      DISCHARGE NOTE:       Laney M Reyes's  results have been reviewed with her.  She has been counseled regarding her diagnosis, treatment, and plan.  She verbally conveys understanding and agreement of the signs, symptoms, diagnosis, treatment and prognosis and additionally agrees to follow up as discussed.  She also agrees with the care-plan and conveys that all of her questions have been answered.  I have also provided discharge instructions for her that include: educational information regarding their diagnosis and treatment, and list of reasons why they would want to return to the ED prior to their follow-up appointment, should her condition change. She has been provided with education for proper emergency department utilization.         CLINICAL IMPRESSION:         1. Left upper quadrant abdominal pain              PLAN:   1. Discharge Home  2.      Medication List        START  taking these medications      ondansetron 4 MG Tbdl  Commonly known as: ZOFRAN-ODT  Take 1 tablet (4 mg total) by mouth every 8 (eight) hours as needed (Nasuea).            CHANGE how you take these medications      * dicyclomine 10 MG capsule  Commonly known as: BENTYL  What changed: Another medication with the same name was added. Make sure you understand how and when to take each.     * dicyclomine 20 mg tablet  Commonly known as: BENTYL  Take 1 tablet (20 mg total) by mouth 2 (two) times daily as needed (Abdominal Cramps/Pain).  What changed: You were already taking a medication with the same name, and this prescription was added. Make sure you understand how and when to take each.           * This list has 2 medication(s) that are the same as other medications prescribed for you. Read the directions carefully, and ask your doctor or other care provider to review them with you.                ASK your doctor about these medications      aspirin 81 MG Chew  Take 1 tablet (81 mg total) by mouth once daily.     clopidogreL 75 mg tablet  Commonly known as: PLAVIX  Take 1 tablet (75 mg total) by mouth once daily.     norelgestromin-ethinyl estradiol 150-35 mcg/24 hr               Where to Get Your Medications        These medications were sent to [facility] [address] [place] [address]      Phone: [phone]   dicyclomine 20 mg tablet  ondansetron 4 MG Tbdl        3. Verito Valladares MD  [address] [address]  [phone]    Schedule an appointment as soon as possible for a visit in 3 days      [facility] Emergency Department  [address] [place] [id number] [phone]  Go to   If symptoms worsen       _______________________________     Please note that this dictation was completed with [employer], the computer voice recognition software.  Quite often unanticipated grammatical, syntax, homophones, and  other interpretive errors are inadvertently transcribed by the computer software.  Please disregard these errors.  Please excuse any errors that have escaped final proofreading.             Eric Hernández MD  06/19/24 0844